# Patient Record
Sex: MALE | Race: WHITE | NOT HISPANIC OR LATINO | Employment: OTHER | URBAN - NONMETROPOLITAN AREA
[De-identification: names, ages, dates, MRNs, and addresses within clinical notes are randomized per-mention and may not be internally consistent; named-entity substitution may affect disease eponyms.]

---

## 2018-11-28 ENCOUNTER — HISTORICAL (OUTPATIENT)
Dept: ADMINISTRATIVE | Facility: HOSPITAL | Age: 63
End: 2018-11-28

## 2018-12-23 ENCOUNTER — HISTORICAL (OUTPATIENT)
Dept: ADMINISTRATIVE | Facility: HOSPITAL | Age: 63
End: 2018-12-23

## 2019-01-16 ENCOUNTER — HISTORICAL (OUTPATIENT)
Dept: CARDIOLOGY | Facility: HOSPITAL | Age: 64
End: 2019-01-16

## 2022-04-28 NOTE — OP NOTE
Patient:   Wallace Lowry            MRN: [id number]            FIN: [id number]               Age:   63 years     Sex:  Male     :  1955   Associated Diagnoses:   None   Author:   Mathieu Hanna MD      Indication-coronary artery disease  Procedure-coronary angiography, Ivus, stenting of mid RCA    Findings-mid RCA 74% lesion, proximal circumflex about 55-60% lesion  Status post successful stenting of the mid RCA with a 2.75x 15 mm drug-eluting stent    Hemostasis-vascade    Recommendations  Dual antiplatelet therapy  Follow-up in [facility]

## 2023-06-14 ENCOUNTER — HOSPITAL ENCOUNTER (EMERGENCY)
Facility: HOSPITAL | Age: 68
Discharge: ANOTHER HOSPITAL | End: 2023-06-14
Attending: FAMILY MEDICINE
Payer: MEDICARE

## 2023-06-14 ENCOUNTER — HOSPITAL ENCOUNTER (INPATIENT)
Facility: HOSPITAL | Age: 68
LOS: 8 days | Discharge: NURSING, REHAB OR LONG-TERM CARE | DRG: 065 | End: 2023-06-22
Attending: INTERNAL MEDICINE | Admitting: INTERNAL MEDICINE
Payer: MEDICARE

## 2023-06-14 VITALS
RESPIRATION RATE: 16 BRPM | HEIGHT: 64 IN | TEMPERATURE: 99 F | BODY MASS INDEX: 22.67 KG/M2 | SYSTOLIC BLOOD PRESSURE: 146 MMHG | OXYGEN SATURATION: 99 % | DIASTOLIC BLOOD PRESSURE: 117 MMHG | HEART RATE: 108 BPM | WEIGHT: 132.81 LBS

## 2023-06-14 DIAGNOSIS — R52 PAIN: ICD-10-CM

## 2023-06-14 DIAGNOSIS — I63.9 CEREBROVASCULAR ACCIDENT (CVA), UNSPECIFIED MECHANISM: Primary | ICD-10-CM

## 2023-06-14 DIAGNOSIS — I63.9 STROKE: ICD-10-CM

## 2023-06-14 DIAGNOSIS — I16.1 HYPERTENSIVE EMERGENCY: ICD-10-CM

## 2023-06-14 DIAGNOSIS — I10 HYPERTENSION: ICD-10-CM

## 2023-06-14 DIAGNOSIS — I63.9 CVA (CEREBRAL VASCULAR ACCIDENT): ICD-10-CM

## 2023-06-14 DIAGNOSIS — R55 SYNCOPE: ICD-10-CM

## 2023-06-14 LAB
ALBUMIN SERPL-MCNC: 4.9 G/DL (ref 3.4–5)
ALBUMIN/GLOB SERPL: 1.4 RATIO
ALP SERPL-CCNC: 77 UNIT/L (ref 50–144)
ALT SERPL-CCNC: 24 UNIT/L (ref 1–45)
AMPHET UR QL SCN: NEGATIVE
ANION GAP SERPL CALC-SCNC: 11 MEQ/L (ref 2–13)
APPEARANCE UR: CLEAR
APTT PPP: 23.7 SECONDS (ref 23–29.4)
AST SERPL-CCNC: 37 UNIT/L (ref 17–59)
BARBITURATE SCN PRESENT UR: NEGATIVE
BASOPHILS # BLD AUTO: 0.04 X10(3)/MCL (ref 0.01–0.08)
BASOPHILS NFR BLD AUTO: 0.5 % (ref 0.1–1.2)
BENZODIAZ UR QL SCN: NEGATIVE
BILIRUB UR QL STRIP.AUTO: ABNORMAL MG/DL
BILIRUBIN DIRECT+TOT PNL SERPL-MCNC: 1.2 MG/DL (ref 0–1)
BUN SERPL-MCNC: 19 MG/DL (ref 7–20)
CALCIUM SERPL-MCNC: 9.2 MG/DL (ref 8.4–10.2)
CANNABINOIDS UR QL SCN: NEGATIVE
CHLORIDE SERPL-SCNC: 104 MMOL/L (ref 98–110)
CHOLEST SERPL-MCNC: 219 MG/DL (ref 0–200)
CO2 SERPL-SCNC: 23 MMOL/L (ref 21–32)
COCAINE UR QL SCN: NEGATIVE
COLOR UR: YELLOW
CREAT SERPL-MCNC: 1.14 MG/DL (ref 0.66–1.25)
CREAT/UREA NIT SERPL: 17 (ref 12–20)
EOSINOPHIL # BLD AUTO: 0.02 X10(3)/MCL (ref 0.04–0.54)
EOSINOPHIL NFR BLD AUTO: 0.3 % (ref 0.7–7)
ERYTHROCYTE [DISTWIDTH] IN BLOOD BY AUTOMATED COUNT: 13.3 %
GFR SERPLBLD CREATININE-BSD FMLA CKD-EPI: 71 MLS/MIN/1.73/M2
GLOBULIN SER-MCNC: 3.4 GM/DL (ref 2–3.9)
GLUCOSE SERPL-MCNC: 91 MG/DL (ref 70–115)
GLUCOSE UR QL STRIP.AUTO: NEGATIVE MG/DL
HCT VFR BLD AUTO: 49.9 % (ref 36–52)
HDLC SERPL-MCNC: 86 MG/DL (ref 40–60)
HGB BLD-MCNC: 17.8 G/DL (ref 13–18)
HIV 1+2 AB+HIV1 P24 AG SERPL QL IA: NONREACTIVE
IMM GRANULOCYTES # BLD AUTO: 0.02 X10(3)/MCL (ref 0–0.03)
IMM GRANULOCYTES NFR BLD AUTO: 0.3 % (ref 0–0.5)
INR BLD: 0.97
KETONES UR QL STRIP.AUTO: 15 MG/DL
LDLC SERPL DIRECT ASSAY-SCNC: 100.6 MG/DL (ref 30–100)
LEUKOCYTE ESTERASE UR QL STRIP.AUTO: NEGATIVE UNIT/L
LYMPHOCYTES # BLD AUTO: 0.72 X10(3)/MCL (ref 1.32–3.57)
LYMPHOCYTES NFR BLD AUTO: 9.5 % (ref 20–55)
MCH RBC QN AUTO: 31.7 PG (ref 27–34)
MCHC RBC AUTO-ENTMCNC: 35.7 G/DL (ref 31–37)
MCV RBC AUTO: 88.8 FL (ref 79–99)
METHADONE UR QL SCN: NEGATIVE
MONOCYTES # BLD AUTO: 0.44 X10(3)/MCL (ref 0.3–0.82)
MONOCYTES NFR BLD AUTO: 5.8 % (ref 4.7–12.5)
NEUTROPHILS # BLD AUTO: 6.37 X10(3)/MCL (ref 1.78–5.38)
NEUTROPHILS NFR BLD AUTO: 83.6 % (ref 37–73)
NITRITE UR QL STRIP.AUTO: NEGATIVE
NRBC BLD AUTO-RTO: 0 %
OPIATES UR QL SCN: NEGATIVE
PCP UR QL: NEGATIVE
PH UR STRIP.AUTO: 6 [PH]
PH UR: 6 [PH] (ref 3–11)
PLATELET # BLD AUTO: 257 X10(3)/MCL (ref 140–371)
PMV BLD AUTO: 10.3 FL (ref 9.4–12.4)
POCT GLUCOSE: 73 MG/DL (ref 70–110)
POTASSIUM SERPL-SCNC: 4.3 MMOL/L (ref 3.5–5.1)
PROT SERPL-MCNC: 8.3 GM/DL (ref 6.3–8.2)
PROT UR QL STRIP.AUTO: NEGATIVE MG/DL
PROTHROMBIN TIME: 9.9 SECONDS (ref 9.3–11.9)
RBC # BLD AUTO: 5.62 X10(6)/MCL (ref 4–6)
RBC UR QL AUTO: ABNORMAL UNIT/L
SODIUM SERPL-SCNC: 138 MMOL/L (ref 135–145)
SP GR UR STRIP.AUTO: >=1.03
TRIGL SERPL-MCNC: 52 MG/DL (ref 30–200)
TROPONIN I SERPL-MCNC: <0.012 NG/ML (ref 0–0.03)
TSH SERPL-ACNC: 4.38 UIU/ML (ref 0.36–3.74)
UROBILINOGEN UR STRIP-ACNC: 1 MG/DL
VIT B12 SERPL-MCNC: 334 PG/ML (ref 213–816)
WBC # SPEC AUTO: 7.61 X10(3)/MCL (ref 4–11.5)

## 2023-06-14 PROCEDURE — 80053 COMPREHEN METABOLIC PANEL: CPT | Performed by: FAMILY MEDICINE

## 2023-06-14 PROCEDURE — 85610 PROTHROMBIN TIME: CPT | Performed by: FAMILY MEDICINE

## 2023-06-14 PROCEDURE — 85025 COMPLETE CBC W/AUTO DIFF WBC: CPT | Performed by: FAMILY MEDICINE

## 2023-06-14 PROCEDURE — 93010 EKG 12-LEAD: ICD-10-PCS | Mod: ,,, | Performed by: INTERNAL MEDICINE

## 2023-06-14 PROCEDURE — 81003 URINALYSIS AUTO W/O SCOPE: CPT | Performed by: FAMILY MEDICINE

## 2023-06-14 PROCEDURE — 85730 THROMBOPLASTIN TIME PARTIAL: CPT | Performed by: FAMILY MEDICINE

## 2023-06-14 PROCEDURE — 84484 ASSAY OF TROPONIN QUANT: CPT | Performed by: FAMILY MEDICINE

## 2023-06-14 PROCEDURE — 87389 HIV-1 AG W/HIV-1&-2 AB AG IA: CPT | Performed by: STUDENT IN AN ORGANIZED HEALTH CARE EDUCATION/TRAINING PROGRAM

## 2023-06-14 PROCEDURE — 84443 ASSAY THYROID STIM HORMONE: CPT | Performed by: FAMILY MEDICINE

## 2023-06-14 PROCEDURE — 93010 ELECTROCARDIOGRAM REPORT: CPT | Mod: ,,, | Performed by: INTERNAL MEDICINE

## 2023-06-14 PROCEDURE — 96366 THER/PROPH/DIAG IV INF ADDON: CPT

## 2023-06-14 PROCEDURE — 25000003 PHARM REV CODE 250: Performed by: FAMILY MEDICINE

## 2023-06-14 PROCEDURE — 93005 ELECTROCARDIOGRAM TRACING: CPT

## 2023-06-14 PROCEDURE — 20000000 HC ICU ROOM

## 2023-06-14 PROCEDURE — 63600175 PHARM REV CODE 636 W HCPCS: Performed by: STUDENT IN AN ORGANIZED HEALTH CARE EDUCATION/TRAINING PROGRAM

## 2023-06-14 PROCEDURE — 80061 LIPID PANEL: CPT | Performed by: FAMILY MEDICINE

## 2023-06-14 PROCEDURE — 25000003 PHARM REV CODE 250: Performed by: STUDENT IN AN ORGANIZED HEALTH CARE EDUCATION/TRAINING PROGRAM

## 2023-06-14 PROCEDURE — 96365 THER/PROPH/DIAG IV INF INIT: CPT

## 2023-06-14 PROCEDURE — 25500020 PHARM REV CODE 255: Performed by: INTERNAL MEDICINE

## 2023-06-14 PROCEDURE — 25000003 PHARM REV CODE 250: Performed by: INTERNAL MEDICINE

## 2023-06-14 PROCEDURE — 82962 GLUCOSE BLOOD TEST: CPT

## 2023-06-14 PROCEDURE — 80307 DRUG TEST PRSMV CHEM ANLYZR: CPT | Performed by: FAMILY MEDICINE

## 2023-06-14 PROCEDURE — 99285 EMERGENCY DEPT VISIT HI MDM: CPT | Mod: 25

## 2023-06-14 PROCEDURE — 36415 COLL VENOUS BLD VENIPUNCTURE: CPT | Performed by: FAMILY MEDICINE

## 2023-06-14 PROCEDURE — 82607 VITAMIN B-12: CPT | Performed by: STUDENT IN AN ORGANIZED HEALTH CARE EDUCATION/TRAINING PROGRAM

## 2023-06-14 PROCEDURE — 86780 TREPONEMA PALLIDUM: CPT | Performed by: INTERNAL MEDICINE

## 2023-06-14 RX ORDER — LABETALOL HYDROCHLORIDE 5 MG/ML
10 INJECTION, SOLUTION INTRAVENOUS
Status: DISCONTINUED | OUTPATIENT
Start: 2023-06-14 | End: 2023-06-15

## 2023-06-14 RX ORDER — SODIUM CHLORIDE, SODIUM LACTATE, POTASSIUM CHLORIDE, CALCIUM CHLORIDE 600; 310; 30; 20 MG/100ML; MG/100ML; MG/100ML; MG/100ML
INJECTION, SOLUTION INTRAVENOUS CONTINUOUS
Status: DISCONTINUED | OUTPATIENT
Start: 2023-06-14 | End: 2023-06-15

## 2023-06-14 RX ORDER — MUPIROCIN 20 MG/G
OINTMENT TOPICAL 2 TIMES DAILY
Status: COMPLETED | OUTPATIENT
Start: 2023-06-14 | End: 2023-06-19

## 2023-06-14 RX ORDER — SODIUM CHLORIDE 0.9 % (FLUSH) 0.9 %
10 SYRINGE (ML) INJECTION
Status: DISCONTINUED | OUTPATIENT
Start: 2023-06-14 | End: 2023-06-14

## 2023-06-14 RX ORDER — SODIUM CHLORIDE 0.9 % (FLUSH) 0.9 %
10 SYRINGE (ML) INJECTION
Status: DISCONTINUED | OUTPATIENT
Start: 2023-06-14 | End: 2023-06-22 | Stop reason: HOSPADM

## 2023-06-14 RX ORDER — NICARDIPINE HYDROCHLORIDE 0.2 MG/ML
0-15 INJECTION INTRAVENOUS CONTINUOUS
Status: DISCONTINUED | OUTPATIENT
Start: 2023-06-14 | End: 2023-06-14 | Stop reason: DRUGHIGH

## 2023-06-14 RX ORDER — ATORVASTATIN CALCIUM 40 MG/1
40 TABLET, FILM COATED ORAL DAILY
Status: DISCONTINUED | OUTPATIENT
Start: 2023-06-15 | End: 2023-06-22 | Stop reason: HOSPADM

## 2023-06-14 RX ORDER — NICARDIPINE HYDROCHLORIDE 0.2 MG/ML
2.5 INJECTION INTRAVENOUS CONTINUOUS
Status: DISCONTINUED | OUTPATIENT
Start: 2023-06-14 | End: 2023-06-14

## 2023-06-14 RX ORDER — NAPROXEN SODIUM 220 MG/1
81 TABLET, FILM COATED ORAL DAILY
Status: DISCONTINUED | OUTPATIENT
Start: 2023-06-15 | End: 2023-06-22 | Stop reason: HOSPADM

## 2023-06-14 RX ORDER — ASPIRIN 325 MG
325 TABLET ORAL ONCE
Status: COMPLETED | OUTPATIENT
Start: 2023-06-14 | End: 2023-06-14

## 2023-06-14 RX ADMIN — THIAMINE HYDROCHLORIDE 500 MG: 100 INJECTION, SOLUTION INTRAMUSCULAR; INTRAVENOUS at 06:06

## 2023-06-14 RX ADMIN — SODIUM CHLORIDE, POTASSIUM CHLORIDE, SODIUM LACTATE AND CALCIUM CHLORIDE 500 ML: 600; 310; 30; 20 INJECTION, SOLUTION INTRAVENOUS at 05:06

## 2023-06-14 RX ADMIN — MUPIROCIN: 20 OINTMENT TOPICAL at 09:06

## 2023-06-14 RX ADMIN — SODIUM CHLORIDE, POTASSIUM CHLORIDE, SODIUM LACTATE AND CALCIUM CHLORIDE: 600; 310; 30; 20 INJECTION, SOLUTION INTRAVENOUS at 07:06

## 2023-06-14 RX ADMIN — ASPIRIN 325 MG: 325 TABLET ORAL at 05:06

## 2023-06-14 RX ADMIN — NICARDIPINE HYDROCHLORIDE 5 MG/HR: 25 INJECTION, SOLUTION INTRAVENOUS at 12:06

## 2023-06-14 RX ADMIN — IOPAMIDOL 100 ML: 755 INJECTION, SOLUTION INTRAVENOUS at 05:06

## 2023-06-14 NOTE — NURSING
Nurses Note -- 4 Eyes      6/14/2023   5:10 PM      Skin assessed during: Admit      [x] No Altered Skin Integrity Present    [x]Prevention Measures Documented      [] Yes- Altered Skin Integrity Present or Discovered   [] LDA Added if Not in Epic (Describe Wound)   [] New Altered Skin Integrity was Present on Admit and Documented in LDA   [] Wound Image Taken    Wound Care Consulted? No    Attending Nurse:  Marlee Augustin RN     Second RN/Staff Member:  Lazaro Beckham RN

## 2023-06-14 NOTE — Clinical Note
Pt arrived to [facility] for [facility], AAO x 4, expressive aphasia noted-nods yes and no appropriately to questions, able to follow all commands. Awaiting anesthesia for consent.

## 2023-06-14 NOTE — ED PROVIDER NOTES
Encounter Date: 6/14/2023       History     Chief Complaint   Patient presents with    Cerebrovascular Accident    Extremity Weakness     Pt in per EMS from home with c/o right sided weakness and aphasia onset 24+ hours.       67-year-old white male brought to emergency room per [employer] past son calling EMS for patient with stroke symptoms of right-sided weakness and unable to talk.  Patient states all this started yesterday morning and has not changed.  Patient denies any pain nausea vomiting or fever.  Patient denies any voiding complaints.  Patient does have a past history of CVA as well as hypertension.  Patient states his last stroke or 2-3 years ago and he is not taken his blood pressure medicine years.    The history is provided by the patient and the EMS personnel.   Review of patient's allergies indicates:   Allergen Reactions    Shellfish derived      Past Medical History:   Diagnosis Date    Hypertension     Stroke      No past surgical history on file.  No family history on file.     Review of Systems   Neurological:  Positive for speech difficulty and weakness.   All other systems reviewed and are negative.    Physical Exam     Initial Vitals [06/14/23 1129]   BP Pulse Resp Temp SpO2   (!) 225/153 (!) 116 16 98.6 °F (37 °C) 99 %      MAP       --         Physical Exam    Nursing note and vitals reviewed.  Constitutional:   Thin unkempt white male alert in no acute distress.   HENT:   Head is atraumatic and normocephalic.  External canal with cerumen bilaterally.  Oropharynx is clear moist mucous membranes and extremely poor dentition.  Nose is clear with no discharge.  Mild right facial droop but normal sensation.   Eyes:   Pupils equal round reactive to light.  Extraocular muscles are intact.   Neck:   Neck is supple with full range of motion of cervical lymphadenopathy.   Cardiovascular:            Heart is tachycardic regular rate and rhythm without murmur.   Pulmonary/Chest:   Lungs with a rare  rhonchi but otherwise clear.   Abdominal:   Abdomen with positive bowel sounds throughout.  Abdomen is soft and nontender with no guarding or rebound.   Musculoskeletal:      Comments: Right upper and lower extremity with decreased range of motion and decreased strength.  Left upper extremity left lower extremity with normal sensation and strength.  No clubbing cyanosis or edema noted.     Neurological:   Patient has a awake and alert and oriented x3.  Patient with expressive aphasia.  As noted above patient with right-sided weakness but sensation appears to be intact.   Skin:   Skin is warm and dry.   Psychiatric:   Patient's mood and affect appear normal his thought content behavior also normal.       ED Course   Procedures  Labs Reviewed   COMPREHENSIVE METABOLIC PANEL - Abnormal; Notable for the following components:       Result Value    Protein Total 8.3 (*)     Bilirubin Total 1.2 (*)     All other components within normal limits   TSH - Abnormal; Notable for the following components:    Thyroid Stimulating Hormone 4.380 (*)     All other components within normal limits   LIPID PANEL - Abnormal; Notable for the following components:    Cholesterol Total 219 (*)     HDL Cholesterol 86 (*)     LDL Cholesterol Direct 100.6 (*)     All other components within normal limits   CBC WITH DIFFERENTIAL - Abnormal; Notable for the following components:    Neut % 83.6 (*)     Lymph % 9.5 (*)     Eos % 0.3 (*)     Lymph # 0.72 (*)     Neut # 6.37 (*)     Eos # 0.02 (*)     All other components within normal limits   URINALYSIS - Abnormal; Notable for the following components:    Ketones, UA 15 (*)     Blood, UA Trace-Intact (*)     Bilirubin, UA Small (*)     All other components within normal limits    Narrative:      URINE STABILITY IS 2 HOURS AT ROOM TEMP OR    SIX HOURS REFRIGERATED. PERFORMING TESTING ON    SPECIMENS GREATER THAN THIS AGE MAY AFFECT THE    FOLLOWING TESTS:    PH          SPECIFIC GRAVITY            BLOOD    CLARITY     BILIRUBIN               UROBILINOGEN   PROTIME-INR - Normal    Narrative:     Protimes are used to monitor anticoagulant agents such as warfarin. PT INR values are based on the current patient normal mean and the TONY value for the specific instrument reagent used.  **Routine theraputic target values for the INR are 2.0-3.0**   TROPONIN I - Normal   APTT - Normal   DRUG SCREEN, URINE (BEAKER) - Normal    Narrative:     Cut off concentrations:    Amphetamines - 1000 ng/ml  Barbiturates - 200 ng/ml  Benzodiazepine - 200 ng/ml  Cannabinoids (THC) - 50 ng/ml  Cocaine - 300 ng/ml  Fentanyl - 1.0 ng/ml  MDMA - 500 ng/ml  Opiates - 300 ng/ml   Phencyclidine (PCP) - 25 ng/ml  Methadone - 300 ng/ml      False negatives may result form substances such as bleach added to urine.  False positives may result for the presence of a substance with similar chemical structure to the drug or its metabolite.    This test provides only a PRELIMINARY analytical test result. A more specific alternate chemical method must be used in order to obtain a confirmed analytical result. Gas chromatography/mass spectrometry (GC/MS) is the preferred confirmatory method. Other chemical confirmation methods are available. Clinical consideration and professional judgement should be applied to any drug of abuse test result, particularly when preliminary positive results are used.    Positive results will be confirmed only at the physicians request. Unconfirmed screening results are to be used only for medical purposes (treatment).          CBC W/ AUTO DIFFERENTIAL    Narrative:     The following orders were created for panel order CBC W/ AUTO DIFFERENTIAL.  Procedure                               Abnormality         Status                     ---------                               -----------         ------                     CBC with Differential[[id number]]        Abnormal            Final result                 Please view results  for these tests on the individual orders.   SARS CORONAVIRUS 2 ANTIGEN POCT, MANUAL READ   POCT GLUCOSE     EKG Readings: (Independently Interpreted)   Initial Reading: No STEMI. Rhythm: Sinus Tachycardia. Heart Rate: 108. Ectopy: No Ectopy. Conduction: Normal. ST Segments: Normal ST Segments. T Waves: Normal. Axis: Normal. Clinical Impression: Sinus Tachycardia Other Impression: With LVH     Imaging Results              X-Ray Chest AP Portable (Final result)  Result time 06/14/23 12:18:49      Final result by Greg Alfredo III, MD (06/14/23 12:18:49)                   Impression:      1. I see no lobar or segmental infiltrates or other significant abnormalities.See above comments.      Electronically signed by: Greg Alfredo  Date:    06/14/2023  Time:    12:18               Narrative:    EXAMINATION:  STUDY: XR CHEST AP PORTABLE    CLINICAL HISTORY AND TECHNIQUE:  Juan Jose Chen, RT on 6/14/2023 11:50 AM    CURRENT CLINICAL HISTORY: ER PT    X 1 DAY    -AMS, RIGHT SIDED WEAKNESS    PAST MEDICAL HISTORY: HTN, STROKE    TECHNIQUE: 1 VIEW CHEST PORTABLE    TECHNOLOGIST: HANNAH    COMPARISON:  None    FINDINGS:  The lungs are under expanded exaggerating the interstitial lung markings.The cardiac, hilar, and mediastinal contours appear unremarkable.I see no lobar or segmental infiltrates.No significant pleural effusions are noted.Mild degenerative changes are noted involving the thoracic spine.                                       CT Head Without Contrast (Final result)  Result time 06/14/23 12:18:20      Final result by Greg Alfredo III, MD (06/14/23 12:18:20)                   Impression:      1. Chronic changes are present including atrophy and ischemia.  See above comments.      Electronically signed by: Greg Alfredo  Date:    06/14/2023  Time:    12:18               Narrative:    EXAMINATION:  STUDY: CT HEAD WITHOUT CONTRAST    CLINICAL HISTORY AND TECHNIQUE:  Josephine Burr, RT on 6/14/2023 11:59 AM    PT STATUS:  ER    PROCEDURE: CT BRAIN WO    CLINICAL HX : X'S 2 DAYS, AMS, RT SIDE WEAKNESS, APHASIA    PMH: HTN, CVA    IV CONTRAST: NONE    ORAL CONTRAST: NONE    RECTAL CONTRAST: NONE    AXIAL IMAGES @ 5MM INTERVALS WITH MULTIPLANAR RECONSTRUCTION    TOTAL IMAGE NUMBER: 35    NUMBER OF CT SCANS IN PAST 12 MONTHS: 1    CTDIvol(mGy): HEAD:  52.3   BODY:    DLP(mGycm): HEAD: 1018.2    BODY:    TECH: AKG/DB    PT TRANSPORTED W/O INCIDENT    This patient has had 1 CT and nuclear medicine scans performed within the last 12 months.    The following DOSE REDUCTION TECHNIQUES are used for all CT scans at [facility]:    1. Automated exposure control.  2. Adjustment of the mA and/or kv according to patient size.  3. Use of iterative reconstruction technique.    COMPARISON:  12/23/2018    FINDINGS:  Axial imaging through the head/brain was performed. Moderate cerebral atrophy accentuates the ventricles and sulci and fairly extensive chronic ischemic changes are noted within the deep white matter of both cerebral hemispheres.  I see no intraparenchymal masses, hemorrhagic lesions, or dominant wedge-shaped infarcts. I see no extra-axial masses or abnormal fluid collections.                                       Medications   niCARdipine (CARDENE) 0.2 mg/mL in sodium chloride 0.9% 250 mL infusion (0 mg/hr Intravenous Stopped 6/14/23 1415)     Medical Decision Making:   Initial Assessment:   CVA  Differential Diagnosis:   TIA, hypertension  Clinical Tests:   Lab Tests: Ordered and Reviewed  The following lab test(s) were unremarkable: CBC  Radiological Study: Ordered and Reviewed  Medical Tests: Ordered and Reviewed  ED Management:  We will go ahead and do stroke protocol and started on Cardene drip for his hypertension.  We will go ahead and do a stat CT of the head without.  Patient's CT of head and chest x-ray are both unremarkable.  Awaiting all blood results.  Patient's blood pressure is coming down on the Cardene  drip.  Patient's blood work is unremarkable.  Because of patient's CVA will go ahead and attempted transfer for neurology care.  Patient's blood pressure is doing better again on the Cardene drip.  Other:   I have discussed this case with another health care provider.       <> Summary of the Discussion: Dr. Mathews will be accepting physician at [facility] patient will be a direct admit to ICU bed 21.                        Clinical Impression:   Final diagnoses:  [I63.9] Stroke  [R52] Pain  [I63.9] Cerebrovascular accident (CVA), unspecified mechanism (Primary)  [I16.1] Hypertensive emergency        ED Disposition Condition    Transfer to Another Facility Stable                Quentin Perkins MD  06/14/23 2154

## 2023-06-14 NOTE — ED NOTES
Per Annalise, ED RN, reported that pt was accepted at [facility] ICU Bed 21.  Report called to [phone].  Accepted by Dr. Mathews.

## 2023-06-14 NOTE — H&P
[facility] - [facility] ICU  Pulmonary Critical Care Note    Patient Name: Wallace Lowry  MRN: [id number]  Admission Date: 6/14/2023  Hospital Length of Stay: 0 days  Code Status: Full Code  Attending Provider: Yobani Pinon MD  Primary Care Provider: Primary Doctor No     Subjective:     HPI:     Wallace Lowry is a 67 y.o. male with PMH of HTN, HLD, prior CVA, carotid artery stenosis s/p R carotid stent approximately 2 years ago, alcohol abuse, who presents to [facility] as a transfer from [facility] for neurology services for CVA workup. Patient had originally woken up 6/13 with new findings of right arm and right leg weakness, right sided facial droop, and slurred speech.  Last known normal was the night prior of 6/12. These symptoms persisted throughout the day and diet and he was brought to ED for evaluation today 6/14. Patient does have hx of stroke and HTN but states he has not taken any of his his medications for at least a year as he could not afford them.  Patient denies having any headache, dizziness, seizures, falls, syncopal episodes.  Denies any chest pain, shortness of breath, palpitations.  He has no reported history of arrhythmias. Patient is a former smoker, quit smoking about 9 months ago, about 8 year history smoking 1 ppd. He denies illicit drug use, though endorses drinking at least a 6 pack per beer daily with last drink 2 days ago. At OSH patient was found to be significantly hypertensive with a BP of 225/153.  Also tachycardic.  Afebrile.  Lab work including CBC, CMP, cardiac enzymes, UA, UDS were unremarkable.  He had a CT head that was negative for acute hemorrhage, though did reveal chronic atrophy and chronic ischemic changes.  They had put the patient on a nicardipine infusion, and he was transferred directly to [facility] ICU for further workup and evaluation.    Hospital Course/Significant events:      24 Hour Interval History:      Past Medical History:   Diagnosis Date     Hypertension     Stroke        No past surgical history on file.    Social History     Socioeconomic History    Marital status:            No current outpatient medications    Current Inpatient Medications   [START ON 6/15/2023] aspirin  81 mg Oral Daily    [START ON 6/15/2023] atorvastatin  40 mg Oral Daily    [START ON 6/15/2023] folic acid (FOLVITE) IVPB  1 mg Intravenous Daily    lactated ringers  500 mL Intravenous Once    [START ON 6/15/2023] multivitamin  1 tablet Oral Daily    mupirocin   Nasal BID    thiamine (VITAMIN B1) IVPB  500 mg Intravenous TID    Followed by    [START ON 6/18/2023] thiamine (VITAMIN B1) IVPB  250 mg Intravenous Daily       Current Intravenous Infusions   lactated ringers           Review of Systems   Constitutional:  Negative for chills and fever.   HENT:  Positive for hearing loss (right, chronic).    Eyes:  Negative for blurred vision.   Respiratory:  Negative for shortness of breath.    Cardiovascular:  Negative for chest pain.   Gastrointestinal:  Negative for abdominal pain, nausea and vomiting.   Musculoskeletal:  Negative for myalgias.   Skin:  Negative for rash.   Neurological:  Positive for speech change and focal weakness. Negative for dizziness, seizures, loss of consciousness and headaches.        Objective:     No intake or output data in the 24 hours ending 06/14/23 1757      Vital Signs (Most Recent):  Temp: 98.3 °F (36.8 °C) (06/14/23 1640)  Pulse: (!) 133 (06/14/23 1700)  Resp: (!) 22 (06/14/23 1700)  BP: (!) 152/101 (06/14/23 1700)  SpO2: 97 % (06/14/23 1700)  Body mass index is 20.25 kg/m².  Weight: 53.5 kg (117 lb 15.1 oz) Vital Signs (24h Range):  Temp:  [98.3 °F (36.8 °C)-98.6 °F (37 °C)] 98.3 °F (36.8 °C)  Pulse:  [104-141] 133  Resp:  [16-22] 22  SpO2:  [96 %-100 %] 97 %  BP: (132-225)/() 152/101     Physical Exam  Constitutional:       General: He is not in acute distress.     Appearance: He is not ill-appearing.   HENT:      Head: Normocephalic  and atraumatic.      Right Ear: External ear normal.      Left Ear: External ear normal.      Mouth/Throat:      Mouth: Mucous membranes are dry.   Eyes:      General: No scleral icterus.     Extraocular Movements: Extraocular movements intact.      Pupils: Pupils are equal, round, and reactive to light.   Cardiovascular:      Rate and Rhythm: Regular rhythm. Tachycardia present.      Pulses: Normal pulses.      Heart sounds: No murmur heard.    No friction rub. No gallop.   Pulmonary:      Effort: No respiratory distress.      Breath sounds: No wheezing, rhonchi or rales.   Abdominal:      General: There is no distension.      Palpations: Abdomen is soft.      Tenderness: There is no abdominal tenderness.   Musculoskeletal:      Cervical back: Normal range of motion.      Right lower leg: No edema.      Left lower leg: No edema.   Skin:     General: Skin is warm and dry.   Neurological:      Mental Status: He is alert.      Comments: Awake, alert and oriented x3. CN II-XII grossly intact with exception of right sided facial droop and chronic right ear hearing loss. Unable to hold RUE and RLE up against gravity. LUE and LLE 5/5 strength. Sensation to light touch intact in all 4 extremities. Intact finger to nose on the left, right unable to perform due to weakness. Speech is dysarthric. Gait deferred          Lines/Drains/Airways       Peripheral Intravenous Line  Duration                  Peripheral IV - Single Lumen 18 G Anterior;Distal;Left Antecubital -- days                    Significant Labs:    Lab Results   Component Value Date    WBC 7.61 06/14/2023    HGB 17.8 06/14/2023    HCT 49.9 06/14/2023    MCV 88.8 06/14/2023     06/14/2023           BMP  Lab Results   Component Value Date     06/14/2023    K 4.3 06/14/2023    CHLORIDE 104 06/14/2023    CO2 23 06/14/2023    BUN 19.0 06/14/2023    CREATININE 1.14 06/14/2023    CALCIUM 9.2 06/14/2023    AGAP 11.0 06/14/2023         ABG  No results for  input(s): PH, PO2, PCO2, HCO3, POCBASEDEF in the last 168 hours.    Mechanical Ventilation Support:         Significant Imaging:  I have reviewed the pertinent imaging within the past 24 hours.        Assessment/Plan:     Assessment  CVA rule-out in the setting of right sided weakness, facial droop, dysarthria  Alcohol abuse  Hypertension  Hyperlipidemia  Hx CVA  Hx carotid artery stenosis s/p right carotid stent about 2 years ago    Plan  Admit to ICU for further management  Will discontinue nicardipine infusion, allow for permissive hypertension up to 220/120  CTH was negative for hemorrhage. Obtain CTA head and neck, brain MRI, TTE w/ bubble  Load with aspirin, start high intensity statin  Continuous telemetry monitoring. Patient in sinus rhythm  Neurology consulted, appreciate recommendation  Maintenance IVF  Keep NPO until passes Elias swallow eval/cleared by SLP  Thiamine/MVI and CIWA protocol with PRN ativan for withdrawal symptoms    DVT Prophylaxis: SCDs     32 minutes of critical care was time spent personally by me on the following activities: development of treatment plan with patient or surrogate and bedside caregivers, discussions with consultants, evaluation of patient's response to treatment, examination of patient, ordering and performing treatments and interventions, ordering and review of laboratory studies, ordering and review of radiographic studies, pulse oximetry, re-evaluation of patient's condition.  This critical care time did not overlap with that of any other provider or involve time for any procedures.     Estrada Ulloa MD  Pulmonary Critical Care Medicine  [facility] - [facility] ICU

## 2023-06-15 PROBLEM — I63.9 STROKE: Status: ACTIVE | Noted: 2023-06-15

## 2023-06-15 LAB
ANION GAP SERPL CALC-SCNC: 11 MEQ/L
BASOPHILS # BLD AUTO: 0.06 X10(3)/MCL
BASOPHILS NFR BLD AUTO: 0.8 %
BUN SERPL-MCNC: 12.8 MG/DL (ref 8.4–25.7)
CALCIUM SERPL-MCNC: 8.6 MG/DL (ref 8.8–10)
CHLORIDE SERPL-SCNC: 101 MMOL/L (ref 98–107)
CO2 SERPL-SCNC: 23 MMOL/L (ref 23–31)
CREAT SERPL-MCNC: 0.93 MG/DL (ref 0.73–1.18)
CREAT/UREA NIT SERPL: 14
EOSINOPHIL # BLD AUTO: 0.05 X10(3)/MCL (ref 0–0.9)
EOSINOPHIL NFR BLD AUTO: 0.7 %
ERYTHROCYTE [DISTWIDTH] IN BLOOD BY AUTOMATED COUNT: 13.5 % (ref 11.5–17)
EST. AVERAGE GLUCOSE BLD GHB EST-MCNC: 88.2 MG/DL
GFR SERPLBLD CREATININE-BSD FMLA CKD-EPI: >60 MLS/MIN/1.73/M2
GLUCOSE SERPL-MCNC: 88 MG/DL (ref 82–115)
HBA1C MFR BLD: 4.7 %
HCT VFR BLD AUTO: 44.9 % (ref 42–52)
HGB BLD-MCNC: 15.7 G/DL (ref 14–18)
IMM GRANULOCYTES # BLD AUTO: 0.03 X10(3)/MCL (ref 0–0.04)
IMM GRANULOCYTES NFR BLD AUTO: 0.4 %
LYMPHOCYTES # BLD AUTO: 1.51 X10(3)/MCL (ref 0.6–4.6)
LYMPHOCYTES NFR BLD AUTO: 19.8 %
MCH RBC QN AUTO: 31.2 PG (ref 27–31)
MCHC RBC AUTO-ENTMCNC: 35 G/DL (ref 33–36)
MCV RBC AUTO: 89.1 FL (ref 80–94)
MONOCYTES # BLD AUTO: 0.59 X10(3)/MCL (ref 0.1–1.3)
MONOCYTES NFR BLD AUTO: 7.7 %
NEUTROPHILS # BLD AUTO: 5.38 X10(3)/MCL (ref 2.1–9.2)
NEUTROPHILS NFR BLD AUTO: 70.6 %
NRBC BLD AUTO-RTO: 0 %
PLATELET # BLD AUTO: 261 X10(3)/MCL (ref 130–400)
PMV BLD AUTO: 10.9 FL (ref 7.4–10.4)
POTASSIUM SERPL-SCNC: 3.9 MMOL/L (ref 3.5–5.1)
RBC # BLD AUTO: 5.04 X10(6)/MCL (ref 4.7–6.1)
SODIUM SERPL-SCNC: 135 MMOL/L (ref 136–145)
WBC # SPEC AUTO: 7.62 X10(3)/MCL (ref 4.5–11.5)

## 2023-06-15 PROCEDURE — 80048 BASIC METABOLIC PNL TOTAL CA: CPT | Performed by: STUDENT IN AN ORGANIZED HEALTH CARE EDUCATION/TRAINING PROGRAM

## 2023-06-15 PROCEDURE — 63600175 PHARM REV CODE 636 W HCPCS: Performed by: INTERNAL MEDICINE

## 2023-06-15 PROCEDURE — 21400001 HC TELEMETRY ROOM

## 2023-06-15 PROCEDURE — 63600175 PHARM REV CODE 636 W HCPCS: Performed by: NURSE PRACTITIONER

## 2023-06-15 PROCEDURE — 92611 MOTION FLUOROSCOPY/SWALLOW: CPT

## 2023-06-15 PROCEDURE — 25000003 PHARM REV CODE 250: Performed by: STUDENT IN AN ORGANIZED HEALTH CARE EDUCATION/TRAINING PROGRAM

## 2023-06-15 PROCEDURE — 85025 COMPLETE CBC W/AUTO DIFF WBC: CPT | Performed by: STUDENT IN AN ORGANIZED HEALTH CARE EDUCATION/TRAINING PROGRAM

## 2023-06-15 PROCEDURE — 83036 HEMOGLOBIN GLYCOSYLATED A1C: CPT | Performed by: STUDENT IN AN ORGANIZED HEALTH CARE EDUCATION/TRAINING PROGRAM

## 2023-06-15 PROCEDURE — 63600175 PHARM REV CODE 636 W HCPCS: Performed by: STUDENT IN AN ORGANIZED HEALTH CARE EDUCATION/TRAINING PROGRAM

## 2023-06-15 PROCEDURE — 92610 EVALUATE SWALLOWING FUNCTION: CPT

## 2023-06-15 PROCEDURE — 25000003 PHARM REV CODE 250: Performed by: INTERNAL MEDICINE

## 2023-06-15 RX ORDER — HYDROCHLOROTHIAZIDE 25 MG/1
25 TABLET ORAL DAILY
Status: ON HOLD | COMMUNITY
End: 2023-06-19 | Stop reason: HOSPADM

## 2023-06-15 RX ORDER — LABETALOL HYDROCHLORIDE 5 MG/ML
10 INJECTION, SOLUTION INTRAVENOUS EVERY 4 HOURS PRN
Status: DISCONTINUED | OUTPATIENT
Start: 2023-06-15 | End: 2023-06-22 | Stop reason: HOSPADM

## 2023-06-15 RX ORDER — CLONIDINE HYDROCHLORIDE 0.1 MG/1
0.1 TABLET ORAL EVERY 6 HOURS PRN
Status: DISCONTINUED | OUTPATIENT
Start: 2023-06-15 | End: 2023-06-22 | Stop reason: HOSPADM

## 2023-06-15 RX ORDER — HYDRALAZINE HYDROCHLORIDE 20 MG/ML
10 INJECTION INTRAMUSCULAR; INTRAVENOUS EVERY 4 HOURS PRN
Status: DISCONTINUED | OUTPATIENT
Start: 2023-06-15 | End: 2023-06-15

## 2023-06-15 RX ORDER — ENOXAPARIN SODIUM 100 MG/ML
40 INJECTION SUBCUTANEOUS EVERY 12 HOURS
Status: DISCONTINUED | OUTPATIENT
Start: 2023-06-15 | End: 2023-06-15

## 2023-06-15 RX ORDER — LABETALOL 200 MG/1
200 TABLET, FILM COATED ORAL EVERY 12 HOURS
Status: DISCONTINUED | OUTPATIENT
Start: 2023-06-15 | End: 2023-06-16

## 2023-06-15 RX ORDER — CLOPIDOGREL BISULFATE 75 MG/1
75 TABLET ORAL DAILY
Status: ON HOLD | COMMUNITY
End: 2023-06-19 | Stop reason: HOSPADM

## 2023-06-15 RX ORDER — AMLODIPINE BESYLATE 10 MG/1
10 TABLET ORAL NIGHTLY
COMMUNITY

## 2023-06-15 RX ORDER — HYDRALAZINE HYDROCHLORIDE 20 MG/ML
10 INJECTION INTRAMUSCULAR; INTRAVENOUS EVERY 4 HOURS PRN
Status: DISCONTINUED | OUTPATIENT
Start: 2023-06-15 | End: 2023-06-22 | Stop reason: HOSPADM

## 2023-06-15 RX ORDER — METOPROLOL SUCCINATE 100 MG/1
100 TABLET, EXTENDED RELEASE ORAL DAILY
Status: ON HOLD | COMMUNITY
End: 2023-06-19 | Stop reason: HOSPADM

## 2023-06-15 RX ORDER — SODIUM CHLORIDE, SODIUM LACTATE, POTASSIUM CHLORIDE, CALCIUM CHLORIDE 600; 310; 30; 20 MG/100ML; MG/100ML; MG/100ML; MG/100ML
INJECTION, SOLUTION INTRAVENOUS CONTINUOUS
Status: DISCONTINUED | OUTPATIENT
Start: 2023-06-15 | End: 2023-06-16

## 2023-06-15 RX ORDER — ENOXAPARIN SODIUM 100 MG/ML
40 INJECTION SUBCUTANEOUS EVERY 24 HOURS
Status: DISCONTINUED | OUTPATIENT
Start: 2023-06-15 | End: 2023-06-22 | Stop reason: HOSPADM

## 2023-06-15 RX ORDER — ATORVASTATIN CALCIUM 80 MG/1
80 TABLET, FILM COATED ORAL DAILY
COMMUNITY

## 2023-06-15 RX ORDER — AMLODIPINE BESYLATE 5 MG/1
10 TABLET ORAL DAILY
Status: DISCONTINUED | OUTPATIENT
Start: 2023-06-15 | End: 2023-06-22 | Stop reason: HOSPADM

## 2023-06-15 RX ORDER — LABETALOL HYDROCHLORIDE 5 MG/ML
10 INJECTION, SOLUTION INTRAVENOUS EVERY 4 HOURS PRN
Status: DISCONTINUED | OUTPATIENT
Start: 2023-06-15 | End: 2023-06-15

## 2023-06-15 RX ADMIN — THERA TABS 1 TABLET: TAB at 08:06

## 2023-06-15 RX ADMIN — THIAMINE HYDROCHLORIDE 500 MG: 100 INJECTION, SOLUTION INTRAMUSCULAR; INTRAVENOUS at 06:06

## 2023-06-15 RX ADMIN — THIAMINE HYDROCHLORIDE 500 MG: 100 INJECTION, SOLUTION INTRAMUSCULAR; INTRAVENOUS at 11:06

## 2023-06-15 RX ADMIN — LABETALOL HYDROCHLORIDE 10 MG: 5 INJECTION, SOLUTION INTRAVENOUS at 01:06

## 2023-06-15 RX ADMIN — MUPIROCIN: 20 OINTMENT TOPICAL at 09:06

## 2023-06-15 RX ADMIN — THIAMINE HYDROCHLORIDE 500 MG: 100 INJECTION, SOLUTION INTRAMUSCULAR; INTRAVENOUS at 08:06

## 2023-06-15 RX ADMIN — AMLODIPINE BESYLATE 10 MG: 5 TABLET ORAL at 03:06

## 2023-06-15 RX ADMIN — LABETALOL HYDROCHLORIDE 10 MG: 5 INJECTION, SOLUTION INTRAVENOUS at 08:06

## 2023-06-15 RX ADMIN — LABETALOL HYDROCHLORIDE 200 MG: 200 TABLET, FILM COATED ORAL at 06:06

## 2023-06-15 RX ADMIN — MUPIROCIN: 20 OINTMENT TOPICAL at 08:06

## 2023-06-15 RX ADMIN — THIAMINE HYDROCHLORIDE 500 MG: 100 INJECTION, SOLUTION INTRAMUSCULAR; INTRAVENOUS at 12:06

## 2023-06-15 RX ADMIN — ASPIRIN 81 MG CHEWABLE TABLET 81 MG: 81 TABLET CHEWABLE at 08:06

## 2023-06-15 RX ADMIN — SODIUM CHLORIDE, POTASSIUM CHLORIDE, SODIUM LACTATE AND CALCIUM CHLORIDE: 600; 310; 30; 20 INJECTION, SOLUTION INTRAVENOUS at 06:06

## 2023-06-15 RX ADMIN — FOLIC ACID 1 MG: 5 INJECTION, SOLUTION INTRAMUSCULAR; INTRAVENOUS; SUBCUTANEOUS at 08:06

## 2023-06-15 RX ADMIN — ENOXAPARIN SODIUM 40 MG: 40 INJECTION SUBCUTANEOUS at 04:06

## 2023-06-15 RX ADMIN — HYDRALAZINE HYDROCHLORIDE 10 MG: 20 INJECTION INTRAMUSCULAR; INTRAVENOUS at 04:06

## 2023-06-15 RX ADMIN — ATORVASTATIN CALCIUM 40 MG: 40 TABLET, FILM COATED ORAL at 08:06

## 2023-06-15 RX ADMIN — SODIUM CHLORIDE, POTASSIUM CHLORIDE, SODIUM LACTATE AND CALCIUM CHLORIDE: 600; 310; 30; 20 INJECTION, SOLUTION INTRAVENOUS at 10:06

## 2023-06-15 NOTE — NURSING
Nurses Note -- 4 Eyes      6/15/2023   6:14 PM      Skin assessed during: Daily Assessment      [x] No Altered Skin Integrity Present    [x]Prevention Measures Documented      [] Yes- Altered Skin Integrity Present or Discovered   [] LDA Added if Not in Epic (Describe Wound)   [] New Altered Skin Integrity was Present on Admit and Documented in LDA   [] Wound Image Taken    Wound Care Consulted? No    Attending Nurse:  Linda Tellez RN     Second RN/Staff Member:  Thanh

## 2023-06-15 NOTE — PLAN OF CARE
06/15/23 1215   Discharge Assessment   Assessment Type Discharge Planning Assessment   Confirmed/corrected address, phone number and insurance Yes   Confirmed Demographics Correct on Facesheet   Source of Information patient;family   When was your last doctors appointment?   (None.)   Communicated RICKIE with patient/caregiver Yes   Reason For Admission Hypertension   People in Home child(cinthia), adult   Do you expect to return to your current living situation? Yes   Do you have help at home or someone to help you manage your care at home? Yes   Who are your caregiver(s) and their phone number(s)? Son: Elie Lowry: [phone] and BrotherFam: [phone]   Prior to hospitilization cognitive status: Unable to Assess   Current cognitive status: Alert/Oriented   Home Accessibility stairs to enter home   Number of Stairs, Main Entrance six   Home Layout Able to live on 1st floor   Equipment Currently Used at Home none   Readmission within 30 days? No   Patient currently being followed by outpatient case management? No   Do you currently have service(s) that help you manage your care at home? No   Do you take prescription medications? Yes   Do you have prescription coverage? Yes   Coverage Medicare Part A & B   Do you have any problems affording any of your prescribed medications? No   Is the patient taking medications as prescribed? no   If no, which medications is patient not taking? Patient does not take any meds are prescribed   Who is going to help you get home at discharge? Patient's son, Elie Lowry   How do you get to doctors appointments? car, drives self   Are you on dialysis? No   Do you take coumadin?   (Unknown)   Discharge Plan A Home with family   Discharge Plan B Home   Discharge Plan discussed with: Patient;Sibling;Adult children   Name(s) and Number(s) Son: Elie Lowry: [phone] BrotherFam: [phone]   Transition of Care Barriers None   OTHER   Name(s) of People in  Home Patient resides with his son.       Patient's physical address: [address] [place] [id number].  SW discussed Rethinking Drinking education after patient reported drinking 6 10 oz cans of beers daily. Patient verbalized understanding, agreed and accepted information.

## 2023-06-15 NOTE — CONSULTS
[facility] - [facility] ICU  Neurology  Consult Note    Patient Name: Wallace Lowry  MRN: [id number]  Admission Date: 6/14/2023  Hospital Length of Stay: 1 days  Code Status: Full Code   Attending Provider: Lilia Woods MD   Consulting Provider: Nieves Marquez NP  Primary Care Physician: Primary Doctor No  Principal Problem:<principal problem not specified>    Inpatient consult to Neurology  Consult performed by: Nieves Marquez NP  Consult ordered by: Estrada Ulloa MD         Subjective:     Chief Complaint:  Right sided weakness     HPI:   67 year old male with a past medical history of HTN, HLD, prior CVA (unknown deficits), VIRI s/p R carotid stent, former smoker (quit smoking 9 months ago), and alcohol abuse presented to [facility] as a transfer from [facility] for stroke. Per EMR last known normal 6/12. He woke up on 6/13 with right sided weakness, facial droop, and slurred speech. He was brought to ED on 6/14 for further evaluation. He reported he was not compliant with medications. Upon arrival at [facility], /153. CT head was negative for acute intracranial abnormalities. He was transferred to [facility] for hypertensive crisis and further workup. Neurology was consulted for stroke workup.               Past Medical History:   Diagnosis Date    Hypertension     Stroke        No past surgical history on file.    Review of patient's allergies indicates:   Allergen Reactions    Shellfish derived        Current Neurological Medications:     Current Facility-Administered Medications on File Prior to Encounter   Medication    [DISCONTINUED] niCARdipine (CARDENE) 0.2 mg/mL in sodium chloride 0.9% 250 mL infusion    [DISCONTINUED] niCARdipine 40 mg/200 mL (0.2 mg/mL) infusion    [DISCONTINUED] niCARdipine 40 mg/200 mL (0.2 mg/mL) infusion     No current outpatient medications on file prior to encounter.     Family History    None       Tobacco Use    Smoking status: Not on  file    Smokeless tobacco: Not on file   Substance and Sexual Activity    Alcohol use: Not on file    Drug use: Not on file    Sexual activity: Not on file     Review of Systems   Unable to perform ROS: Acuity of condition (limited information 2/2 dysarthria/aphasia)   Objective:     Vital Signs (Most Recent):  Temp: 98.1 °F (36.7 °C) (06/15/23 0800)  Pulse: 104 (06/15/23 0900)  Resp: 17 (06/15/23 0900)  BP: (!) 168/105 (06/15/23 0900)  SpO2: 97 % (06/15/23 0900) Vital Signs (24h Range):  Temp:  [98.1 °F (36.7 °C)-98.6 °F (37 °C)] 98.1 °F (36.7 °C)  Pulse:  [] 104  Resp:  [13-25] 17  SpO2:  [96 %-100 %] 97 %  BP: (132-204)/() 168/105     Weight: 53.5 kg (117 lb 15.1 oz)  Body mass index is 20.25 kg/m².     Physical Exam  Vitals and nursing note reviewed.   Constitutional:       General: He is not in acute distress.     Appearance: He is not toxic-appearing.   HENT:      Head: Normocephalic.   Eyes:      General: No visual field deficit.     Pupils: Pupils are equal, round, and reactive to light.   Pulmonary:      Effort: Pulmonary effort is normal.   Musculoskeletal:         General: Normal range of motion.      Cervical back: Normal range of motion.      Right lower leg: No edema.      Left lower leg: No edema.   Skin:     General: Skin is warm and dry.      Capillary Refill: Capillary refill takes less than 2 seconds.   Neurological:      Mental Status: He is alert.      Cranial Nerves: Dysarthria and facial asymmetry (mild right facial droop) present.      Sensory: No sensory deficit.      Motor: Weakness (RUE, RLE weakness) present. No tremor, atrophy, abnormal muscle tone or seizure activity.      Comments: Oriented to self, delayed responses   Slowed dysarthric speech  Able to repeat phrases  Follow commands  Right sided weakness, RUE 3/5, RLE 3+/5     Psychiatric:         Attention and Perception: Attention normal.         Mood and Affect: Affect is labile and tearful.         Speech: Speech  is delayed and slurred.         Behavior: Behavior is slowed. Behavior is cooperative.        NEUROLOGICAL EXAMINATION:     MENTAL STATUS   Speech: slurred     CRANIAL NERVES     CN III, IV, VI   Pupils are equal, round, and reactive to light.    Significant Labs:   Recent Lab Results         06/15/23  0104   06/14/23  1656   06/14/23  1330   06/14/23  1207        Phencyclidine     Negative         Albumin/Globulin Ratio       1.4       Albumin       4.9       Alkaline Phosphatase       77       ALT       24       Amphetamine Screen, Ur     Negative         Anion Gap 11.0       11.0       Appearance, UA     Clear         aPTT       23.7  Comment: For Minimal Heparin Infusion, the goal aPTT 64-85 seconds corresponds to an anti-Xa of 0.3-0.5.    For Low Intensity and High Intensity Heparin, the goal aPTT  seconds corresponds to an anti-Xa of 0.3-0.7       AST       37       Barbiturate Screen, Ur     Negative         Baso # 0.06       0.04       Basophil % 0.8       0.5       Benzodiazepine Screen, Urine     Negative         BILIRUBIN TOTAL       1.2       Bilirubin, UA     Small         BUN 12.8       19.0       BUN/CREAT RATIO 14       17       Calcium 8.6       9.2       Cannabinoids, Urine     Negative         Chloride 101       104       Cholesterol       219       CO2 23       23       Cocaine (Metab.)     Negative         Color, UA     Yellow         Creatinine 0.93       1.14       eGFR >60       71  Comment:                      EGFR INTERPRETATION    Beginning 8/15/22 we are reporting the eGFRcr calculation as recommended by the National Kidney Foundation. The eGFRcr equation has similar overall performance characteristics to the older equation, but the values may differ by more than 10% particularly at higher values of eGFRcr and younger adult ages.    NKF stages of chronic kidney disease (CKD)  Stage 1: Kidney damage with normal or increased eGFR (>90 mL/min/1.73 m^2)  Stage 2: Mild reduction in GFR  (60-89 mL/min/1.73 m^2)  Stage 3a: Moderate reduction in GFR (45-59 mL/min/1.73 m^2)  Stage 3b: Moderate reduction in GFR (30-44 mL/min/1.73 m^2)  Stage 4: Severe reduction in GFR (15-29 mL/min/1.73 m^2)  Stage 5: Kidney failure (GFR <15 mL/min/1.73 m^2)           Eos # 0.05       0.02       Eosinophil % 0.7       0.3       Estimated Avg Glucose 88.2             Globulin, Total       3.4       Glucose 88       91       Glucose, UA     Negative         HDL       86       Hematocrit 44.9       49.9       Hemoglobin 15.7       17.8       Hemoglobin A1C External 4.7             HIV   Nonreactive           Immature Grans (Abs) 0.03       0.02       Immature Granulocytes 0.4       0.3       INR       0.97       Ketones, UA     15         LDL Cholesterol Direct       100.6       Leukocytes, UA     Negative         Lymph # 1.51       0.72       LYMPH % 19.8       9.5       MCH 31.2       31.7       MCHC 35.0       35.7       MCV 89.1       88.8       Methadone, Urine     Negative         Mono # 0.59       0.44       Mono % 7.7       5.8       MPV 10.9       10.3       Neut # 5.38       6.37       Neut % 70.6       83.6       NITRITE UA     Negative         nRBC 0.0       0.0       Occult Blood UA     Trace-Intact         Opiate Scrn, Ur     Negative         pH, UA     6.0         pH, Urine     6.0         Platelets 261       257       Potassium 3.9       4.3       PROTEIN TOTAL       8.3       Protein, UA     Negative         Protime       9.9       RBC 5.04       5.62       RDW 13.5       13.3       Sodium 135       138       Specific Gravity,UA     >=1.030         Thyroid Stimulating Hormone       4.380       Triglycerides       52       Troponin I       <0.012       Urobilinogen, UA     1.0         Vitamin B-12   334           WBC 7.62       7.61               Significant Imaging: I have reviewed all pertinent imaging results/findings within the past 24 hours.    Assessment and Plan:     Stroke  Stroke    Etiology likely  small vessel disease (exacerbated by medical noncompliance)  Lovenox 40 mg Daily  Continue aspirin 81 mg daily  Needs therapy eval  Will need assistance with medications at discharge         Antithrombotics for secondary stroke prevention: Antiplatelets: Aspirin: 81 mg daily    Statins for secondary stroke prevention and hyperlipidemia, if present:   Statins: Atorvastatin- 40 mg daily    Aggressive risk factor modification: HTN, HLD, VIRI s/p stent, etoh abuse     Rehab efforts: The patient has been evaluated by a stroke team provider and the therapy needs have been fully considered based off the presenting complaints and exam findings. The following therapy evaluations are needed: PT evaluate and treat, OT evaluate and treat, SLP evaluate and treat    Diagnostics ordered/pending: TTE to assess cardiac function/status     Stroke workup  MRI brain: left internal capsule posterior limb acute infarct  CTA head and neck:   LDL: 100.6  A1c: 4.7  TSH: 4.380  CTA head and neck:   1.  Calcified plaques bilateral proximal internal carotid arteries without hemodynamically significant stenosis.  2.  Right internal carotid artery cavernous portion 50-60% stenosis      VTE prophylaxis: Enoxaparin 40 mg SQ every 24 hours    BP parameters: Infarct: No intervention, SBP <140 (GOAL BP)            VTE Risk Mitigation (From admission, onward)         Ordered     enoxaparin injection 40 mg  Every 24 hours         06/15/23 1159     Place sequential compression device  Until discontinued         06/14/23 1646     IP VTE LOW RISK PATIENT  Once         06/14/23 1646                Thank you for your consult. Further recommendations to follow from MD Nieves Marquez, NP  Neurology  [facility] - [facility] ICU

## 2023-06-15 NOTE — PROCEDURES
Speech Language Pathology Department  Modified Barium Swallow (MBS) Study    Patient Name:  Wallace Lowry   MRN:  [id number]    Recommendations:     General recommendations:  dysphagia therapy and Speech/Language and Cognitive Evaluation  Repeat MBS study: 10-14 days  Diet recommendations:  Puree Diet - IDDSI Level 4, Liquid Diet Level: Mildly thick liquids - IDDSI Level 2   Swallow strategies/precautions: small bites/sips, slow rate, NO straws, assist with feeding as needed, and medications crushed in puree  General precautions: Standard, aspiration    History/Reason for Referral:     Wallace Lowry is a/n 67 y.o. male admitted on 6/14 with hypertension, right facial droop, slurred speech, and right upper and lower extremity weakness.  Symptom onset was 6/13.  CVA workup was initiated and MRI results are pending.  Pt failed nursing swallow screen.  Pt is unable to provide information regarding any deficits from previous CVA due to emotional state.    Past Medical History:   Diagnosis Date    Hypertension     Stroke      A MBS Study was completed at the bedside to assess the efficiency of his swallow function, rule out aspiration and make recommendations regarding safe dietary consistencies, effective compensatory strategies, and safe eating environment as signs/sx of dysphagia were identified during a clinical swallow evaluation.    Home Diet:  unknown  Current Method of Nutrition: NPO    Subjective:     Patient awake, alert, and labile.    Spiritual/Cultural/Faith Beliefs/Practices that affect care: no  Pain/Comfort: Pain Rating 1: 0/10    Respiratory Status: room air  Restraints/positioning devices: none    Fluoroscopic Results:     Oral Musculature  Dentition: missing teeth and teeth in poor condition  Secretion Management: adequate  Mucosal Quality: good  Facial Movement: reduced right  Buccal Strength & Mobility: impaired  Mandibular Strength & Mobility: WFL  Oral Labial Strength & Mobility: impaired  retraction, impaired pursing, and impaired seal  Lingual Strength & Mobility: impaired strength and impaired right lateral movement  Velar Elevation: WFL  Vocal Quality: strained/strangled    Positioning: upright in bed  Visualization  Patient was seen in the lateral view    Oral Phase:   Reduced lip closure  Reduced lip closure around utensil/straw  Prolonged/disorganized bolus formation  Disorganized lingual movement  Poor anterior-posterior transport  Inadequate mastication  Tongue pumping  Poor bolus cohesion  Loss of bolus control with liquids    Pharyngeal Phase:   Swallow delay with spill to the pyriform sinuses  Reduced base of tongue retraction  Adequate epiglottic deflection  Reduced hyolaryngeal excursion  Poor airway protection  Consistency Laryngeal Penetration Aspiration Residue   Mildly thick liquid by spoon None None Mild  Pyriform sinus   Puree None None None   Thin liquid by cup During the swallow  Did NOT clear None Mild-moderate  Pyriform sinus   Thin liquid by straw During the swallow After the swallow  Cough response present/NOT productive Trace  Pyriform sinus   Mildly thick liquid by cup None None Mild  Pyriform sinus     Cervical Esophageal Phase:   UES appeared to accommodate all bolus types without stasis or retrograde movement visualized    Compensatory Strategies:  Pt with moderate difficulty initiating an additional swallow to clear pharyngeal residue    Assessment:     Pt exhibited moderate-severe oral and mild pharyngeal dysphagia characterized by the findings noted above.  Aspiration of thin liquids was visualized and pt's cough response was NOT effective for clearing contrast material from the trachea.  Both swallow safety and efficiency are impaired.     Patient appears to be at high risk for aspiration related pneumonia when considering poor oral health status, overall health/immune status, reduced laryngeal vestibule closure, and severity of dysphagia.  Prognosis for behavioral  swallow rehabilitation is fair.    Goals:     Multidisciplinary Problems       SLP Goals          Problem: SLP    Goal Priority Disciplines Outcome   SLP Goal     SLP Ongoing, Progressing   Description:   LTG: Tolerate least restrictive PO diet with no clinical signs/sx aspiration  STGs:  1.  Pt will tolerate 2oz of ice chips by spoon with no clinical signs/sx aspiration.   2.  Complete lingual, labial, base of tongue, and laryngeal strengthening exercises with minimal cues  3.  Tolerate thermal stimulation to the anterior faucial pillars with 100% effortful swallow responses and delay less than 2 seconds.                       Patient Education:     Patient provided with verbal education regarding MBS results/recommendations.  Understanding was verbalized, however additional teaching warranted.    Plan:     SLP Follow-Up:  Yes    Patient to be seen:  5 x/week   Plan of Care expires:  06/29/23  Plan of Care reviewed with:  patient     Time Tracking:     SLP Treatment Date:   06/15/23  Speech Start Time:  1440  Speech Stop Time:  1500     Speech Total Time (min):  20 min    Billable minutes:   Motion Fluoroscopic Evaluation, Video Recording, 20 minutes     06/15/2023

## 2023-06-15 NOTE — ASSESSMENT & PLAN NOTE
Stroke    Etiology likely small vessel disease (exacerbated by medical noncompliance)  Lovenox 40 mg Daily  Continue aspirin 81 mg daily  Needs therapy eval  Will need assistance with medications at discharge         Antithrombotics for secondary stroke prevention: Antiplatelets: Aspirin: 81 mg daily    Statins for secondary stroke prevention and hyperlipidemia, if present:   Statins: Atorvastatin- 40 mg daily    Aggressive risk factor modification: HTN, HLD, VIRI s/p stent, etoh abuse     Rehab efforts: The patient has been evaluated by a stroke team provider and the therapy needs have been fully considered based off the presenting complaints and exam findings. The following therapy evaluations are needed: PT evaluate and treat, OT evaluate and treat, SLP evaluate and treat    Diagnostics ordered/pending: TTE to assess cardiac function/status     Stroke workup  MRI brain: left internal capsule posterior limb acute infarct  CTA head and neck:   LDL: 100.6  A1c: 4.7  TSH: 4.380  CTA head and neck:   1.  Calcified plaques bilateral proximal internal carotid arteries without hemodynamically significant stenosis.  2.  Right internal carotid artery cavernous portion 50-60% stenosis      VTE prophylaxis: Enoxaparin 40 mg SQ every 24 hours    BP parameters: Infarct: No intervention, SBP <140 (GOAL BP)

## 2023-06-15 NOTE — SUBJECTIVE & OBJECTIVE
Past Medical History:   Diagnosis Date    Hypertension     Stroke        No past surgical history on file.    Review of patient's allergies indicates:   Allergen Reactions    Shellfish derived        Current Neurological Medications:     Current Facility-Administered Medications on File Prior to Encounter   Medication    [DISCONTINUED] niCARdipine (CARDENE) 0.2 mg/mL in sodium chloride 0.9% 250 mL infusion    [DISCONTINUED] niCARdipine 40 mg/200 mL (0.2 mg/mL) infusion    [DISCONTINUED] niCARdipine 40 mg/200 mL (0.2 mg/mL) infusion     No current outpatient medications on file prior to encounter.     Family History    None       Tobacco Use    Smoking status: Not on file    Smokeless tobacco: Not on file   Substance and Sexual Activity    Alcohol use: Not on file    Drug use: Not on file    Sexual activity: Not on file     Review of Systems   Unable to perform ROS: Acuity of condition (limited information 2/2 dysarthria/aphasia)   Objective:     Vital Signs (Most Recent):  Temp: 98.1 °F (36.7 °C) (06/15/23 0800)  Pulse: 104 (06/15/23 0900)  Resp: 17 (06/15/23 0900)  BP: (!) 168/105 (06/15/23 0900)  SpO2: 97 % (06/15/23 0900) Vital Signs (24h Range):  Temp:  [98.1 °F (36.7 °C)-98.6 °F (37 °C)] 98.1 °F (36.7 °C)  Pulse:  [] 104  Resp:  [13-25] 17  SpO2:  [96 %-100 %] 97 %  BP: (132-204)/() 168/105     Weight: 53.5 kg (117 lb 15.1 oz)  Body mass index is 20.25 kg/m².     Physical Exam  Vitals and nursing note reviewed.   Constitutional:       General: He is not in acute distress.     Appearance: He is not toxic-appearing.   HENT:      Head: Normocephalic.   Eyes:      General: No visual field deficit.     Pupils: Pupils are equal, round, and reactive to light.   Pulmonary:      Effort: Pulmonary effort is normal.   Musculoskeletal:         General: Normal range of motion.      Cervical back: Normal range of motion.      Right lower leg: No edema.      Left lower leg: No edema.   Skin:     General: Skin is  warm and dry.      Capillary Refill: Capillary refill takes less than 2 seconds.   Neurological:      Mental Status: He is alert.      Cranial Nerves: Dysarthria and facial asymmetry (mild right facial droop) present.      Sensory: No sensory deficit.      Motor: Weakness (RUE, RLE weakness) present. No tremor, atrophy, abnormal muscle tone or seizure activity.      Comments: Oriented to self, delayed responses   Slowed dysarthric speech  Able to repeat phrases  Follow commands  Right sided weakness, RUE 3/5, RLE 3+/5     Psychiatric:         Attention and Perception: Attention normal.         Mood and Affect: Affect is labile and tearful.         Speech: Speech is delayed and slurred.         Behavior: Behavior is slowed. Behavior is cooperative.        NEUROLOGICAL EXAMINATION:     MENTAL STATUS   Speech: slurred     CRANIAL NERVES     CN III, IV, VI   Pupils are equal, round, and reactive to light.    Significant Labs:   Recent Lab Results         06/15/23  0104   06/14/23  1656   06/14/23  1330   06/14/23  1207        Phencyclidine     Negative         Albumin/Globulin Ratio       1.4       Albumin       4.9       Alkaline Phosphatase       77       ALT       24       Amphetamine Screen, Ur     Negative         Anion Gap 11.0       11.0       Appearance, UA     Clear         aPTT       23.7  Comment: For Minimal Heparin Infusion, the goal aPTT 64-85 seconds corresponds to an anti-Xa of 0.3-0.5.    For Low Intensity and High Intensity Heparin, the goal aPTT  seconds corresponds to an anti-Xa of 0.3-0.7       AST       37       Barbiturate Screen, Ur     Negative         Baso # 0.06       0.04       Basophil % 0.8       0.5       Benzodiazepine Screen, Urine     Negative         BILIRUBIN TOTAL       1.2       Bilirubin, UA     Small         BUN 12.8       19.0       BUN/CREAT RATIO 14       17       Calcium 8.6       9.2       Cannabinoids, Urine     Negative         Chloride 101       104       Cholesterol        219       CO2 23       23       Cocaine (Metab.)     Negative         Color, UA     Yellow         Creatinine 0.93       1.14       eGFR >60       71  Comment:                      EGFR INTERPRETATION    Beginning 8/15/22 we are reporting the eGFRcr calculation as recommended by the National Kidney Foundation. The eGFRcr equation has similar overall performance characteristics to the older equation, but the values may differ by more than 10% particularly at higher values of eGFRcr and younger adult ages.    NKF stages of chronic kidney disease (CKD)  Stage 1: Kidney damage with normal or increased eGFR (>90 mL/min/1.73 m^2)  Stage 2: Mild reduction in GFR (60-89 mL/min/1.73 m^2)  Stage 3a: Moderate reduction in GFR (45-59 mL/min/1.73 m^2)  Stage 3b: Moderate reduction in GFR (30-44 mL/min/1.73 m^2)  Stage 4: Severe reduction in GFR (15-29 mL/min/1.73 m^2)  Stage 5: Kidney failure (GFR <15 mL/min/1.73 m^2)           Eos # 0.05       0.02       Eosinophil % 0.7       0.3       Estimated Avg Glucose 88.2             Globulin, Total       3.4       Glucose 88       91       Glucose, UA     Negative         HDL       86       Hematocrit 44.9       49.9       Hemoglobin 15.7       17.8       Hemoglobin A1C External 4.7             HIV   Nonreactive           Immature Grans (Abs) 0.03       0.02       Immature Granulocytes 0.4       0.3       INR       0.97       Ketones, UA     15         LDL Cholesterol Direct       100.6       Leukocytes, UA     Negative         Lymph # 1.51       0.72       LYMPH % 19.8       9.5       MCH 31.2       31.7       MCHC 35.0       35.7       MCV 89.1       88.8       Methadone, Urine     Negative         Mono # 0.59       0.44       Mono % 7.7       5.8       MPV 10.9       10.3       Neut # 5.38       6.37       Neut % 70.6       83.6       NITRITE UA     Negative         nRBC 0.0       0.0       Occult Blood UA     Trace-Intact         Opiate Scrn, Ur     Negative         pH, UA      6.0         pH, Urine     6.0         Platelets 261       257       Potassium 3.9       4.3       PROTEIN TOTAL       8.3       Protein, UA     Negative         Protime       9.9       RBC 5.04       5.62       RDW 13.5       13.3       Sodium 135       138       Specific Gravity,UA     >=1.030         Thyroid Stimulating Hormone       4.380       Triglycerides       52       Troponin I       <0.012       Urobilinogen, UA     1.0         Vitamin B-12   334           WBC 7.62       7.61               Significant Imaging: I have reviewed all pertinent imaging results/findings within the past 24 hours.

## 2023-06-15 NOTE — H&P
[facility]  Hospital Medicine History & Physical Examination       Patient Name: Wallace Lowry  MRN: [id number]  Patient Class: IP- Inpatient   Admission Date: 6/14/2023   Admitting Physician: Gabriel Mathews MD  Length of Stay: 1  Attending Physician: Lilia Woods MD   Primary Care Provider: Primary Doctor No  Face-to-Face encounter date: 06/15/2023  Code Status:Full code   Chief Complaint: No chief complaint on file.        Patient information was obtained from patient, patient's family, past medical records and ER records.     HISTORY OF PRESENT ILLNESS:   Wallace Lowry is a 67 y.o. male with a past medical history of essential hypertension, hyperlipidemia, CVA, carotid artery stenosis, and alcohol abuse presented to [facility] on 6/14/2023 transferred from [facility] for CVA.  Patient presented to outside facility for right-sided weakness, right-sided facial droop, and slurred speech.  Patient was hypertensive with BP of 225/153.  CT head was negative for acute hemorrhage.  Patient was placed on Cardene drip and transferred to [facility] ICU for further medical management.  Neurology was consulted and recommended Lovenox 40 mg daily, aspirin 81 mg daily, atorvastatin 40 mg daily, and SBP less than 140.  CTA head and neck revealed calcified plaques at bilateral proximal internal carotid arteries without hemodynamically significant stenosis and 50-60% stenosis of the right internal carotid artery cavernous portion.  MRI brain without contrast revealed left internal capsule posterior limb acute nonhemorrhagic infarct.  Cleviprex was discontinued.  Patient was cleared for downgrade out of ICU on 06/15/2023.  Hospital medicine was consulted for transition of care and further medical management.    PAST MEDICAL HISTORY:   Essential hypertension  Hyperlipidemia  CVA   Carotid artery stenosis   Alcohol abuse    PAST SURGICAL HISTORY:   Carotid stent (R)    ALLERGIES:   Shellfish  derived    FAMILY HISTORY:   Reviewed and negative    SOCIAL HISTORY:   Former smoker-quit 9 months ago   Daily alcohol use-reports drinking 4-6 beers daily  Denies illicit drug use    HOME MEDICATIONS:     Prior to Admission medications    Not on File       REVIEW OF SYSTEMS:   Except as documented, all other systems reviewed and negative     PHYSICAL EXAM:     VITAL SIGNS: 24 HRS MIN & MAX LAST   Temp  Min: 98.1 °F (36.7 °C)  Max: 98.6 °F (37 °C) 98.1 °F (36.7 °C)   BP  Min: 132/94  Max: 204/110 (!) 168/105   Pulse  Min: 70  Max: 141  104   Resp  Min: 13  Max: 25 17   SpO2  Min: 96 %  Max: 100 % 97 %       General appearance: Male  in no apparent distress.  HEENNT: Atraumatic head.   Lungs: Clear to auscultation bilaterally.   Heart: Regular rate and rhythm.    Abdomen: Soft, non-distended, non-tender. Bowel sounds are normal.   Extremities: No cyanosis, clubbing, or edema. No deformities.   Skin: No Rash. Warm and dry.   Neuro: Awake, alert, and oriented.  Dysarthric speech.  Expressive aphasia,  Right facial droop.  Right upper extremity right lower extremity weakness 2/5 strength  Psych/mental status: Appropriate mood and affect. Responds appropriately to questions.     LABS AND IMAGING:     Recent Labs   Lab 06/14/23  1207 06/15/23  0104   WBC 7.61 7.62   RBC 5.62 5.04   HGB 17.8 15.7   HCT 49.9 44.9   MCV 88.8 89.1   MCH 31.7 31.2*   MCHC 35.7 35.0   RDW 13.3 13.5    261   MPV 10.3 10.9*       Recent Labs   Lab 06/14/23  1207 06/15/23  0104    135*   K 4.3 3.9   CO2 23 23   BUN 19.0 12.8   CREATININE 1.14 0.93   CALCIUM 9.2 8.6*   ALBUMIN 4.9  --    ALKPHOS 77  --    ALT 24  --    AST 37  --    BILITOT 1.2*  --        Microbiology Results (last 7 days)       ** No results found for the last 168 hours. **             MRI Brain Without Contrast  Narrative: EXAMINATION:  MRI BRAIN WITHOUT CONTRAST    CLINICAL HISTORY:  Mental status change, unknown cause;    TECHNIQUE:  Multiplanar MRI sequences  were performed of the brain without contrast.    COMPARISON:  CT brain June 14, 2023    FINDINGS:  The posterior limb of the left internal capsule is remarkable for acute nonhemorrhagic infarct with diffusion weighted brightness, signal dropout on ADC map and T2 FLAIR hyperintensity on image 16 series 4.  There are several old lacunar infarcts which involve bilateral thalami, basal ganglia, right corona radiata and bilateral centrum semiovale.  There is also pontine old lacunar infarct.  There is severe cerebral leukoencephalopathy with extensive periventricular and deep white matter T2 FLAIR hyperintense signals which likely is caused by chronic microangiopathic ischemia.  There is generalized cerebral and cerebellar cortical volume loss.  Gradient echo sequences show multiple infratentorial and supratentorial punctate randomly scattered dephasing artifacts related to old hemorrhagic byproducts.    The cerebellar tonsils are normally positioned. There is no acute intracranial hemorrhage, hydrocephalus, midline shift or mass effect. No acute extra axial fluid collections identified.  The right mastoid air cells hyperintense inflammatory signals..  Impression: 1.  Left internal capsule posterior limb acute nonhemorrhagic infarct.    2.  Several old infarcts, severe chronic microangiopathic ischemia and atrophy.    Electronically signed by: Jamie Mccullough  Date:    06/15/2023  Time:    10:18        ASSESSMENT & PLAN:   Assessment:  Acute CVA- left internal capsule posterior limb acute nonhemorrhagic infarct  Carotid artery stenosis   Essential hypertension  Hyperlipidemia   Alcohol abuse    Plan:  Neurochecks q.4  BP parameters per neurology  Amlodipine 10 mg daily   IV PRN anti hypersensitives  Neurology following, appreciate recommendations  PT/OT/SLP  CIWA q 4 hours  IV Ativan PRN  Multivitamin, folate, and thiamine daily  Continue appropriate home medications once med rec updated   Labs in a.m.    VTE Prophylaxis:   Lovenox    __________________________________________________________________________  INPATIENT LIST OF MEDICATIONS     Scheduled Meds:   aspirin  81 mg Oral Daily    atorvastatin  40 mg Oral Daily    enoxparin  40 mg Subcutaneous Q24H (prophylaxis, 1700)    folic acid (FOLVITE) IVPB  1 mg Intravenous Daily    multivitamin  1 tablet Oral Daily    mupirocin   Nasal BID    thiamine (VITAMIN B1) IVPB  500 mg Intravenous TID    Followed by    [START ON 6/18/2023] thiamine (VITAMIN B1) IVPB  250 mg Intravenous Daily     Continuous Infusions:   lactated ringers 100 mL/hr at 06/15/23 1046     PRN Meds:.labetalol, lorazepam, sodium chloride 0.9%      ICaden PA-C, have reviewed and discussed the case with Dr. Lilia Woods MD   Please see the following addendum for further assessment and plan from there attending MD.    06/15/2023    ________________________________________________________________________________    MD Addendum:  I, Dr. Woods assumed care of this patient today at around 2:30 p.m.  For the patient encounter, I performed the substantive portion of the visit, I reviewed the PA documentation, treatment plan, and medical decision making.  I had face to face time with this patient     A. History:    67-year-old male with significant history of HTN, HLD, prior CVA with unknown residual deficit, carotid artery stenosis status post right-sided carotid stent, former smoker quit 9 months back, alcohol abuse presented to the hospital as a transfer from [facility] facility for acute CVA.  Patient went to bed normal on 06/12 and woke up on 06/13 with acute right-sided weakness, facial droop, slurry speech.  Patient was admitted to our ICU on 06/14.  Patient was severely hypertensive with systolic in 200s.  CT head was negative.  Initiated Cardene drip.  Neurology services consulted.  Stroke workup ordered.  MRI brain confirmed left internal capsule posterior limb acute infarct.  CT angiogram with no  hemodynamically significant stenosis in internal carotid, right ICA 50-60% stenosis.  Echocardiogram pending paid neurology recommended aspirin, statin.  Weaned off Cardene drip and downgraded to hospitalist medicine service on 06/15.    B. Physical exam:  Agree with above    C. Medical decision making:    Acute ischemic CVA-left internal capsule posterior limb infarct   Right-sided weakness/dysarthria/expressive aphasia/facial droop secondary to above  Hypertensive emergency on admit  HLD   History of carotid artery stenosis status post right-sided carotid stent placement   Former tobacco use  Alcohol abuse  Medical noncompliance  Prophylaxis    Neurology on board  Recommending low-dose aspirin, statin for now   Off permissive window   Will need tighter control of blood pressure   Goal is less than 140/90   P.r.n. labetalol, hydralazine, clonidine ordered  Scheduled amlodipine and labetalol   Will make adjustments in scheduled antihypertensives based on subsequent BP   Obtain ultrasound carotid  CT angio showed 50-60% stenosis on right ICA, patient has had previous stenting in the past   Echocardiogram is pending   No signs of alcohol withdrawal   Continue multivitamin, folic acid, thiamine  Cleared for p.o. diet by speech   Continue PT/ST/OT   Keep IV fluids until tomorrow, can discontinue oral intake is adequate   DVT prophylaxis-Lovenox    Critical care time-45 minutes  Critical care diagnosis-hypertensive emergency requiring p.r.n. IV antihypertensives   Critical care interventions: Hands-on evaluation, review of labs/radiographs/records and discussions with patient         All diagnosis and differential diagnosis have been reviewed; assessment and plan has been documented; I have personally reviewed the labs and test results that are presently available; I have reviewed the patients medication list; I have reviewed the consulting providers response and recommendations. I have reviewed or attempted to review  medical records based upon their availability.    All of the patient and family questions have been addressed and answered. Patient's is agreeable to the above stated plan. I will continue to monitor closely and make adjustments to medical management as needed.      06/15/2023

## 2023-06-15 NOTE — PLAN OF CARE
Problem: SLP  Goal: SLP Goal  Description:   LTG: Tolerate least restrictive PO diet with no clinical signs/sx aspiration  STGs:  1.  Pt will tolerate 2oz of ice chips by spoon with no clinical signs/sx aspiration.   2.  Complete lingual, labial, base of tongue, and laryngeal strengthening exercises with minimal cues  3.  Tolerate thermal stimulation to the anterior faucial pillars with 100% effortful swallow responses and delay less than 2 seconds.    Outcome: Ongoing, Progressing

## 2023-06-15 NOTE — HPI
67 year old male with a past medical history of HTN, HLD, prior CVA (unknown deficits), VIRI s/p R carotid stent, former smoker (quit smoking 9 months ago), and alcohol abuse presented to [facility] as a transfer from [facility] for stroke. Per EMR last known normal 6/12. He woke up on 6/13 with right sided weakness, facial droop, and slurred speech. He was brought to ED on 6/14 for further evaluation. He reported he was not compliant with medications. Upon arrival at [facility], /153. CT head was negative for acute intracranial abnormalities. He was transferred to [facility] for hypertensive crisis and further workup. Neurology was consulted for stroke workup.

## 2023-06-15 NOTE — PT/OT/SLP PROGRESS
Physical Therapy      Patient Name:  Wallace Lowry   MRN:  [id number]    Patient not seen today for PT eval. Spoke to RN who reported that pt is with increased BP at this time and she does not have any other meds to administer at this time. PT to defer session today and will follow-up tomorrow/as appropriate.

## 2023-06-15 NOTE — PT/OT/SLP PROGRESS
Occupational Therapy      Patient Name:  Wallace Lowry   MRN:  [id number]    OT consult received. Eval attempted, however, patient not seen today secondary to HTN. /125 (goal <140 per neuro notes) . Educated pt on role of OT and assisted RN with repositioning in bed for pressure injury prevention. Will follow-up for OT eval as appropriate.    6/15/2023

## 2023-06-15 NOTE — PT/OT/SLP EVAL
Speech Language Pathology Department  Clinical Swallow Evaluation    Patient Name:  Wallace Lowry   MRN:  [id number]    Recommendations:     General recommendations:  Modified Barium Swallow Study  Diet recommendations:  NPO,     Swallow strategies/precautions: medications crushed in puree  Precautions: Standard, aspiration    History:     Wallace Lowry is a/n 67 y.o. male admitted on 6/14 with hypertension, right facial droop, slurred speech, and right upper and lower extremity weakness.  Symptom onset was 6/13.  CVA workup was initiated and MRI results are pending.  Pt failed nursing swallow screen.  Pt is unable to provide information regarding any deficits from previous CVA due to emotional state.    Past Medical History:   Diagnosis Date    Hypertension     Stroke      Home Diet:  unknown  Current Method of Nutrition: NPO    Subjective     Patient awake, alert, cooperative, and tearful .    Patient goals: to eat/drink     Spiritual/Cultural/Anabaptism Beliefs/Practices that affect care: no  Pain/Comfort: Pain Rating 1: 0/10    Respiratory status: room air  Restraints/positioning devices: none    Objective:     ORAL MUSCULATURE  Dentition: missing teeth and teeth in poor condition  Secretion Management: adequate  Mucosal Quality: good  Facial Movement: reduced right  Buccal Strength & Mobility: impaired  Mandibular Strength & Mobility: WFL  Oral Labial Strength & Mobility: impaired retraction, impaired pursing, and impaired seal  Lingual Strength & Mobility: impaired strength and impaired right lateral movement  Velar Elevation: unable to assess  Vocal Quality: strained/strangled    Consistency Fed By Oral Symptoms Pharyngeal Symptoms   Ice chips SLP Reduced closure around utensil/straw  Slowed oral transit time Reduced laryngeal movement to palpation  Multiple swallows  Wet vocal quality after swallow   Thin liquid by spoon SLP None None   Thin liquid by cup Self None Multiple swallows  Wet vocal quality  after swallow   Thin liquid by straw SLP Reduced closure around utensil/straw  Weak sucking Multiple swallows  Wet vocal quality after swallow   Puree SLP Slowed oral transit time Multiple swallows     Assessment:     Pt presents with signs/sx oropharyngeal dysphagia warranting comprehensive assessment of swallow function to determine safety of PO intake.    Patient Education:     Patient provided with verbal education regarding SLP POC.  Understanding was verbalized.    Plan:     Plan of Care reviewed with:  patient      Time Tracking:     SLP Treatment Date:   06/15/23  Speech Start Time:  0840  Speech Stop Time:  0855     Speech Total Time (min):  15 min    Billable minutes:  Swallow and Oral Function Evaluation, 15 minutes     06/15/2023

## 2023-06-16 LAB
AV INDEX (PROSTH): 0.91
AV MEAN GRADIENT: 3 MMHG
AV PEAK GRADIENT: 5 MMHG
AV REGURGITATION PRESSURE HALF TIME: 295 MS
AV VALVE AREA: 2.85 CM2
AV VELOCITY RATIO: 0.85
BSA FOR ECHO PROCEDURE: 1.55 M2
CV ECHO LV RWT: 0.6 CM
DOP CALC AO PEAK VEL: 1.14 M/S
DOP CALC AO VTI: 15 CM
DOP CALC LVOT AREA: 3.1 CM2
DOP CALC LVOT DIAMETER: 2 CM
DOP CALC LVOT PEAK VEL: 0.97 M/S
DOP CALC LVOT STROKE VOLUME: 42.7 CM3
DOP CALC MV VTI: 12.4 CM
DOP CALCLVOT PEAK VEL VTI: 13.6 CM
E WAVE DECELERATION TIME: 106 MSEC
E/A RATIO: 0.93
E/E' RATIO: 7.38 M/S
ECHO LV POSTERIOR WALL: 1 CM (ref 0.6–1.1)
EJECTION FRACTION: 65 %
FRACTIONAL SHORTENING: 38 % (ref 28–44)
INTERVENTRICULAR SEPTUM: 1.55 CM (ref 0.6–1.1)
LEFT ATRIUM SIZE: 3.3 CM
LEFT ATRIUM VOLUME INDEX MOD: 21.8 ML/M2
LEFT ATRIUM VOLUME MOD: 34 CM3
LEFT CCA DIST DIAS: 7 CM/S
LEFT CCA DIST SYS: 86 CM/S
LEFT CCA PROX DIAS: 18 CM/S
LEFT CCA PROX SYS: 64 CM/S
LEFT ECA DIAS: 0 CM/S
LEFT ECA SYS: 62 CM/S
LEFT ICA DIST DIAS: 16 CM/S
LEFT ICA DIST SYS: 50 CM/S
LEFT ICA MID DIAS: 16 CM/S
LEFT ICA MID SYS: 57 CM/S
LEFT ICA PROX DIAS: 10 CM/S
LEFT ICA PROX SYS: 47 CM/S
LEFT INTERNAL DIMENSION IN SYSTOLE: 2.09 CM (ref 2.1–4)
LEFT VENTRICLE DIASTOLIC VOLUME INDEX: 29.36 ML/M2
LEFT VENTRICLE DIASTOLIC VOLUME: 45.8 ML
LEFT VENTRICLE MASS INDEX: 90 G/M2
LEFT VENTRICLE SYSTOLIC VOLUME INDEX: 9.1 ML/M2
LEFT VENTRICLE SYSTOLIC VOLUME: 14.2 ML
LEFT VENTRICULAR INTERNAL DIMENSION IN DIASTOLE: 3.35 CM (ref 3.5–6)
LEFT VENTRICULAR MASS: 140.2 G
LEFT VERTEBRAL DIAS: 9 CM/S
LEFT VERTEBRAL SYS: 35 CM/S
LV LATERAL E/E' RATIO: 6.69 M/S
LV SEPTAL E/E' RATIO: 8.23 M/S
LVOT MG: 2 MMHG
LVOT MV: 0.63 CM/S
MV MEAN GRADIENT: 3 MMHG
MV PEAK A VEL: 1.15 M/S
MV PEAK E VEL: 1.07 M/S
MV PEAK GRADIENT: 6 MMHG
MV VALVE AREA BY CONTINUITY EQUATION: 3.44 CM2
OHS CV CAROTID RIGHT ICA EDV HIGHEST: 17
OHS CV CAROTID ULTRASOUND LEFT ICA/CCA RATIO: 0.66
OHS CV CAROTID ULTRASOUND RIGHT ICA/CCA RATIO: 1
OHS CV PV CAROTID LEFT HIGHEST CCA: 86
OHS CV PV CAROTID LEFT HIGHEST ICA: 57
OHS CV PV CAROTID RIGHT HIGHEST CCA: 78
OHS CV PV CAROTID RIGHT HIGHEST ICA: 48
OHS CV US CAROTID LEFT HIGHEST EDV: 16
OHS LV EJECTION FRACTION SIMPSONS BIPLANE MOD: 6 %
PISA AR MAX VEL: 3.66 M/S
PISA TR MAX VEL: 1.04 M/S
PV PEAK VELOCITY: 1.56 CM/S
RIGHT CCA DIST DIAS: 8 CM/S
RIGHT CCA DIST SYS: 48 CM/S
RIGHT CCA PROX DIAS: 11 CM/S
RIGHT CCA PROX SYS: 78 CM/S
RIGHT ECA DIAS: 0 CM/S
RIGHT ECA SYS: 41 CM/S
RIGHT ICA DIST DIAS: 17 CM/S
RIGHT ICA DIST SYS: 48 CM/S
RIGHT ICA MID DIAS: 10 CM/S
RIGHT ICA MID SYS: 36 CM/S
RIGHT ICA PROX DIAS: 7 CM/S
RIGHT ICA PROX SYS: 27 CM/S
RIGHT VENTRICULAR END-DIASTOLIC DIMENSION: 1.57 CM
RIGHT VERTEBRAL DIAS: 6 CM/S
RIGHT VERTEBRAL SYS: 31 CM/S
TDI LATERAL: 0.16 M/S
TDI SEPTAL: 0.13 M/S
TDI: 0.15 M/S
TR MAX PG: 4 MMHG

## 2023-06-16 PROCEDURE — 97162 PT EVAL MOD COMPLEX 30 MIN: CPT

## 2023-06-16 PROCEDURE — 25000003 PHARM REV CODE 250: Performed by: STUDENT IN AN ORGANIZED HEALTH CARE EDUCATION/TRAINING PROGRAM

## 2023-06-16 PROCEDURE — 92523 SPEECH SOUND LANG COMPREHEN: CPT

## 2023-06-16 PROCEDURE — 63600175 PHARM REV CODE 636 W HCPCS: Performed by: INTERNAL MEDICINE

## 2023-06-16 PROCEDURE — 21400001 HC TELEMETRY ROOM

## 2023-06-16 PROCEDURE — 25000003 PHARM REV CODE 250: Performed by: INTERNAL MEDICINE

## 2023-06-16 PROCEDURE — 94761 N-INVAS EAR/PLS OXIMETRY MLT: CPT

## 2023-06-16 PROCEDURE — 97167 OT EVAL HIGH COMPLEX 60 MIN: CPT

## 2023-06-16 PROCEDURE — 63600175 PHARM REV CODE 636 W HCPCS: Performed by: STUDENT IN AN ORGANIZED HEALTH CARE EDUCATION/TRAINING PROGRAM

## 2023-06-16 PROCEDURE — 63600175 PHARM REV CODE 636 W HCPCS: Performed by: NURSE PRACTITIONER

## 2023-06-16 PROCEDURE — 97530 THERAPEUTIC ACTIVITIES: CPT

## 2023-06-16 RX ORDER — FOLIC ACID 1 MG/1
1 TABLET ORAL DAILY
Status: DISCONTINUED | OUTPATIENT
Start: 2023-06-17 | End: 2023-06-22 | Stop reason: HOSPADM

## 2023-06-16 RX ORDER — THIAMINE HCL 100 MG
100 TABLET ORAL DAILY
Status: DISCONTINUED | OUTPATIENT
Start: 2023-06-17 | End: 2023-06-22 | Stop reason: HOSPADM

## 2023-06-16 RX ADMIN — LABETALOL HYDROCHLORIDE 200 MG: 200 TABLET, FILM COATED ORAL at 08:06

## 2023-06-16 RX ADMIN — LEUCINE, PHENYLALANINE, LYSINE, METHIONINE, ISOLEUCINE, VALINE, HISTIDINE, THREONINE, TRYPTOPHAN, ALANINE, GLYCINE, ARGININE, PROLINE, SERINE, TYROSINE, SODIUM ACETATE, DIBASIC POTASSIUM PHOSPHATE, MAGNESIUM CHLORIDE, SODIUM CHLORIDE, CALCIUM CHLORIDE, DEXTROSE
311; 238; 247; 170; 255; 247; 204; 179; 77; 880; 438; 489; 289; 213; 17; 297; 261; 51; 77; 33; 5 INJECTION INTRAVENOUS at 11:06

## 2023-06-16 RX ADMIN — SODIUM CHLORIDE, POTASSIUM CHLORIDE, SODIUM LACTATE AND CALCIUM CHLORIDE: 600; 310; 30; 20 INJECTION, SOLUTION INTRAVENOUS at 08:06

## 2023-06-16 RX ADMIN — THERA TABS 1 TABLET: TAB at 08:06

## 2023-06-16 RX ADMIN — ATORVASTATIN CALCIUM 40 MG: 40 TABLET, FILM COATED ORAL at 08:06

## 2023-06-16 RX ADMIN — LABETALOL HYDROCHLORIDE 10 MG: 5 INJECTION, SOLUTION INTRAVENOUS at 06:06

## 2023-06-16 RX ADMIN — FOLIC ACID 1 MG: 5 INJECTION, SOLUTION INTRAMUSCULAR; INTRAVENOUS; SUBCUTANEOUS at 08:06

## 2023-06-16 RX ADMIN — MUPIROCIN: 20 OINTMENT TOPICAL at 08:06

## 2023-06-16 RX ADMIN — LABETALOL HYDROCHLORIDE 300 MG: 100 TABLET, FILM COATED ORAL at 08:06

## 2023-06-16 RX ADMIN — AMLODIPINE BESYLATE 10 MG: 5 TABLET ORAL at 08:06

## 2023-06-16 RX ADMIN — THIAMINE HYDROCHLORIDE 500 MG: 100 INJECTION, SOLUTION INTRAMUSCULAR; INTRAVENOUS at 08:06

## 2023-06-16 RX ADMIN — ASPIRIN 81 MG CHEWABLE TABLET 81 MG: 81 TABLET CHEWABLE at 08:06

## 2023-06-16 RX ADMIN — MUPIROCIN: 20 OINTMENT TOPICAL at 09:06

## 2023-06-16 RX ADMIN — ENOXAPARIN SODIUM 40 MG: 40 INJECTION SUBCUTANEOUS at 04:06

## 2023-06-16 NOTE — PROGRESS NOTES
[facility]  Hospital Medicine Progress Note        Chief Complaint: Inpatient Follow-up    HPI:   67-year-old male with significant history of HTN, HLD, prior CVA with unknown residual deficit, carotid artery stenosis status post right-sided carotid stent, former smoker quit 9 months back, alcohol abuse presented to the hospital as a transfer from outlying facility for acute CVA.  Patient went to bed normal on 06/12 and woke up on 06/13 with acute right-sided weakness, facial droop, slurry speech.  Patient was admitted to our ICU on 06/14.  Patient was severely hypertensive with systolic in 200s.  CT head was negative.  Initiated Cardene drip.  Neurology services consulted.  Stroke workup ordered.  MRI brain confirmed left internal capsule posterior limb acute infarct.  CT angiogram with no hemodynamically significant stenosis in internal carotid, right ICA 50-60% stenosis.  Echocardiogram pending paid neurology recommended aspirin, statin.  Weaned off Cardene drip and downgraded to hospitalist medicine service on 06/15.  Neurology recommending low-dose aspirin, statin.  Will need tighter control of blood pressure. initiated scheduled amlodipine, labetalol.  CT angiogram showed 50-60% stenosis on right ICA, patient has had previous stenting in the past.  Echocardiogram pending.  Therapy Services following paid cleared for modified diet.    Interval Hx:   Patient seen at bedside.  Comfortably laying in bed.  Blood pressure much better today, at goal.  Still having trouble swallowing.  Oral intake is not adequate.  Participating with therapy services.    Objective/physical exam:  General: In no acute distress, afebrile  Chest: Clear to auscultation bilaterally  Heart: S1, S2, no appreciable murmur  Abdomen: Soft, nontender, BS +  MSK: Warm, no lower extremity edema, no clubbing or cyanosis  Neurologic: alert, awake, oriented.  Severe expressive aphasia, dysarthria, right-sided  weakness  VITAL SIGNS: 24 HRS MIN & MAX LAST   Temp  Min: 97.6 °F (36.4 °C)  Max: 98.4 °F (36.9 °C) 97.7 °F (36.5 °C)   BP  Min: 106/75  Max: 193/128 (!) 154/100   Pulse  Min: 76  Max: 105  92   Resp  Min: 14  Max: 31 17   SpO2  Min: 94 %  Max: 98 % 96 %       Recent Labs   Lab 06/15/23  0104   WBC 7.62   RBC 5.04   HGB 15.7   HCT 44.9   MCV 89.1   MCH 31.2*   MCHC 35.0   RDW 13.5      MPV 10.9*         Recent Labs   Lab 06/14/23  1207 06/15/23  0104    135*   K 4.3 3.9   CO2 23 23   BUN 19.0 12.8   CREATININE 1.14 0.93   CALCIUM 9.2 8.6*   ALBUMIN 4.9  --    ALKPHOS 77  --    ALT 24  --    AST 37  --    BILITOT 1.2*  --           Microbiology Results (last 7 days)       ** No results found for the last 168 hours. **             Scheduled Med:   amLODIPine  10 mg Oral Daily    aspirin  81 mg Oral Daily    atorvastatin  40 mg Oral Daily    enoxparin  40 mg Subcutaneous Q24H (prophylaxis, 1700)    folic acid (FOLVITE) IVPB  1 mg Intravenous Daily    labetaloL  300 mg Oral Q12H    multivitamin  1 tablet Oral Daily    mupirocin   Nasal BID    thiamine (VITAMIN B1) IVPB  500 mg Intravenous TID    Followed by    [START ON 6/18/2023] thiamine (VITAMIN B1) IVPB  250 mg Intravenous Daily          Assessment/Plan:    Acute ischemic CVA-left internal capsule posterior limb infarct   Right-sided weakness/dysarthria/expressive aphasia/facial droop secondary to above  Poorly-controlled HTN admitted with hypertensive emergency-improved  Positive bubble study  HLD   History of carotid artery stenosis status post right-sided carotid stent placement   Former tobacco use  Alcohol abuse  Medical noncompliance  Prophylaxis    Patient is on low-dose aspirin, statin as recommended by neurology   Ultrasound carotid with less than 50% stenosis bilaterally   Bubble study positive   I consulted Cardiology today  There planning for [facility] on Monday   NPO after midnight on Sunday   BP at goal on labetalol 300 mg b.i.d., amlodipine 10  mg daily  Continue aggressive PT/OT/ST  Patient still having significant dysphagia and oral intake remains inadequate   Initiated low-dose TPN   Continue multivitamin, thiamine, folic acid  DVT prophylaxis-Lovenox     Case management consult for inpatient rehab placement         Lilia Woods MD   06/16/2023

## 2023-06-16 NOTE — PLAN OF CARE
Problem: Adult Inpatient Plan of Care  Goal: Plan of Care Review  Outcome: Ongoing, Progressing  Goal: Patient-Specific Goal (Individualized)  Outcome: Ongoing, Progressing  Goal: Absence of Hospital-Acquired Illness or Injury  Outcome: Ongoing, Progressing  Goal: Optimal Comfort and Wellbeing  Outcome: Ongoing, Progressing  Goal: Readiness for Transition of Care  Outcome: Ongoing, Progressing     Problem: Adjustment to Illness (Stroke, Ischemic/Transient Ischemic Attack)  Goal: Optimal Coping  Outcome: Ongoing, Progressing     Problem: Bowel Elimination Impaired (Stroke, Ischemic/Transient Ischemic Attack)  Goal: Effective Bowel Elimination  Outcome: Ongoing, Progressing     Problem: Cerebral Tissue Perfusion (Stroke, Ischemic/Transient Ischemic Attack)  Goal: Optimal Cerebral Tissue Perfusion  Outcome: Ongoing, Progressing     Problem: Cognitive Impairment (Stroke, Ischemic/Transient Ischemic Attack)  Goal: Optimal Cognitive Function  Outcome: Ongoing, Progressing     Problem: Communication Impairment (Stroke, Ischemic/Transient Ischemic Attack)  Goal: Improved Communication Skills  Outcome: Ongoing, Progressing     Problem: Functional Ability Impaired (Stroke, Ischemic/Transient Ischemic Attack)  Goal: Optimal Functional Ability  Outcome: Ongoing, Progressing     Problem: Sensorimotor Impairment (Stroke, Ischemic/Transient Ischemic Attack)  Goal: Improved Sensorimotor Function  Outcome: Ongoing, Progressing     Problem: Swallowing Impairment (Stroke, Ischemic/Transient Ischemic Attack)  Goal: Optimal Eating and Swallowing without Aspiration  Outcome: Ongoing, Progressing     Problem: Urinary Elimination Impaired (Stroke, Ischemic/Transient Ischemic Attack)  Goal: Effective Urinary Elimination  Outcome: Met     Problem: Skin Injury Risk Increased  Goal: Skin Health and Integrity  Outcome: Ongoing, Progressing

## 2023-06-16 NOTE — CONSULTS
Inpatient consult to Cardiology  Consult performed by: NICK Goldberg  Consult ordered by: Lilia Woods MD      [facility] - [facility] ICU  Cardiology  Consult Note    Patient Name: Wallace Lowry  MRN: [id number]  Admission Date: 6/14/2023  Hospital Length of Stay: 2 days  Code Status: Full Code   Attending Provider: Lilia Woods MD   Consulting Provider: NICK Goldberg  Primary Care Physician: Primary Doctor No  Principal Problem:<principal problem not specified>    Patient information was obtained from patient, past medical records, and ER records.     Subjective:     Chief Complaint: Reason for Consult: Abnormal ECHO/CVA    HPI: Mr. Lowry is a 66 y/o male who is known to [facility], Dr. Newsome ([place]). The patient presented to [facility] on 6.14.23 with c/o R Sided Weakness, R Sided Facial Droop and Slurred Speech. His initial BP was 225/113 and a CT of the Head without Contrast revealed No Acute Intracranial Hemorrhage. He was placed on a Cardene Drip and transferred to [facility] ICU for Neurology Services. He had a CTA of the Head/Neck which revealed Calcified Plaques at Bilateral Prox ICAs without Hemodynamically Significant Stenossi and 50-60% Stenosis of the R ICA Cavernous Portion. A MRI of the Brain without contrast revealed L Internal Capsule Posterior Limb Acute Non-Hemorrhagic Infarct. He was downgraded to Hospital Medicine and ECHO results revealed a Shunt and for this reason [facility] was consulted for a ZEYAD.    PMH: HTN, HLD, CVA, VIRI/Bilaterally Mild, ETOH Abuse, Tobacco Abuse, COPD, Syncope, Anxiety   PSH: R Carotid Stenting   Family History: Reviewed and Unremarkable for Heart Disease  Social History: Denies Illicit Drug Use; + Tobacco Use, 1 PPD for 40 + Years; + ETOH Use Daily/Unknown Amount.    Previous Cardiac Diagnostics:   Carotid US 6.15.23:  The right internal carotid artery is patent with less than 50% stenosis.  The left internal carotid artery is patent with less than 50%  stenosis.   The bilateral vertebral arteries are patent with antegrade flow.    ECHO 6.15.23:  Study is positive for intercardiac shunt.  The left ventricle is normal in size with concentric remodeling and normal systolic function.  The estimated ejection fraction is 65%.  Indeterminate left ventricular diastolic function.  Normal right ventricular size.  Mild-to-moderate aortic regurgitation.    Stress ECHO 5.23.19:  Stress Testing Impression:  Stress EKG is non-diagnostic.   Sub-maximal exercise treadmill test (MPHR : 72 %).  The functional capacity is fair (7.1 METs).  The heart rate recovery is normal.   Stress ECHO Impression:  The study quality is good.   During peak exercise ejection fraction has improved from 60% to 70%.   Mild (1+) aortic regurgitation was noted during stress.    Carotid US 1.10.19:  The study quality is good.   1-39% stenosis in the proximal right internal carotid artery based on Bluth Criteria.   1-39% stenosis in the proximal left internal carotid artery based on Bluth Criteria.   Antegrade right vertebral artery flow.   Antegrade left vertebral artery flow.     ECHO 1.10.19:  The left ventricle is decreased in size.  Global left ventricular systolic function is normal.  The left ventricular ejection fraction is 60%. Left ventricular diastolic function is normal. Mild to moderate concentric left ventricular hypertrophy is present.  Dilatation of the aortic root is noted. Aortic root diameter is 4.0 cms.   Mild calcification of the aortic valve is noted with adequate cuspal excursion.   Mild to moderate (1-2+) aortic regurgitation. Mild (1+) mitral regurgitation.   The pulmonary artery systolic pressure is 22 mmHg.   right atrial echodensity likely prominent eustachian valve, consider ZEYAD if clinically indicated.    No past surgical history on file.    Review of patient's allergies indicates:   Allergen Reactions    Shellfish derived      No current facility-administered medications on  file prior to encounter.     Current Outpatient Medications on File Prior to Encounter   Medication Sig    amLODIPine (NORVASC) 10 MG tablet Take 10 mg by mouth nightly.    atorvastatin (LIPITOR) 80 MG tablet Take 80 mg by mouth once daily.    clopidogreL (PLAVIX) 75 mg tablet Take 75 mg by mouth once daily.    hydroCHLOROthiazide (HYDRODIURIL) 25 MG tablet Take 25 mg by mouth once daily.    metoprolol succinate (TOPROL-XL) 100 MG 24 hr tablet Take 100 mg by mouth once daily.     Review of Systems   Unable to perform ROS: Acuity of condition     Objective:     Vital Signs (Most Recent):  Temp: 97.9 °F (36.6 °C) (06/16/23 1200)  Pulse: 82 (06/16/23 1200)  Resp: 18 (06/16/23 1200)  BP: 117/75 (06/16/23 1200)  SpO2: (!) 90 % (06/16/23 1200) Vital Signs (24h Range):  Temp:  [97.6 °F (36.4 °C)-98.4 °F (36.9 °C)] 97.9 °F (36.6 °C)  Pulse:  [] 82  Resp:  [14-31] 18  SpO2:  [90 %-98 %] 90 %  BP: (106-190)/() 117/75     Weight: 53.5 kg (117 lb 15.1 oz)  Body mass index is 20.25 kg/m².    SpO2: (!) 90 %         Intake/Output Summary (Last 24 hours) at 6/16/2023 1341  Last data filed at 6/16/2023 1014  Gross per 24 hour   Intake 2537 ml   Output 500 ml   Net 2037 ml     Lines/Drains/Airways       Drain  Duration             Male External Urinary Catheter 06/16/23 1147 <1 day              Peripheral Intravenous Line  Duration                  Peripheral IV - Single Lumen 18 G Anterior;Distal;Left Antecubital -- days         Peripheral IV - Single Lumen 06/16/23 1101 18 G Left Hand <1 day                  Significant Labs:  Recent Results (from the past 72 hour(s))   POCT glucose    Collection Time: 06/14/23 11:36 AM   Result Value Ref Range    POCT Glucose 73 70 - 110 mg/dL   Comprehensive metabolic panel    Collection Time: 06/14/23 12:07 PM   Result Value Ref Range    Sodium Level 138 135 - 145 mmol/L    Potassium Level 4.3 3.5 - 5.1 mmol/L    Chloride 104 98 - 110 mmol/L    Carbon Dioxide 23 21 - 32 mmol/L     Glucose Level 91 70 - 115 mg/dL    Blood Urea Nitrogen 19.0 7.0 - 20.0 mg/dL    Creatinine 1.14 0.66 - 1.25 mg/dL    Calcium Level Total 9.2 8.4 - 10.2 mg/dL    Protein Total 8.3 (H) 6.3 - 8.2 gm/dL    Albumin Level 4.9 3.4 - 5.0 g/dL    Globulin 3.4 2.0 - 3.9 gm/dL    Albumin/Globulin Ratio 1.4 ratio    Bilirubin Total 1.2 (H) 0.0 - 1.0 mg/dL    Alkaline Phosphatase 77 50 - 144 unit/L    Alanine Aminotransferase 24 1 - 45 unit/L    Aspartate Aminotransferase 37 17 - 59 unit/L    eGFR 71 mls/min/1.73/m2    Anion Gap 11.0 2.0 - 13.0 mEq/L    BUN/Creatinine Ratio 17 12 - 20   Protime-INR    Collection Time: 06/14/23 12:07 PM   Result Value Ref Range    PT 9.9 9.3 - 11.9 seconds    INR 0.97 <=5.00   TSH    Collection Time: 06/14/23 12:07 PM   Result Value Ref Range    Thyroid Stimulating Hormone 4.380 (H) 0.360 - 3.740 uIU/mL   LDL - Lipid Panel    Collection Time: 06/14/23 12:07 PM   Result Value Ref Range    Cholesterol Total 219 (H) 0 - 200 mg/dL    HDL Cholesterol 86 (H) 40 - 60 mg/dL    Triglyceride 52 30 - 200 mg/dL    LDL Cholesterol Direct 100.6 (H) 30.0 - 100.0 mg/dL   Troponin I    Collection Time: 06/14/23 12:07 PM   Result Value Ref Range    Troponin-I <0.012 0.000 - 0.034 ng/mL   APTT    Collection Time: 06/14/23 12:07 PM   Result Value Ref Range    PTT 23.7 23.0 - 29.4 seconds   CBC with Differential    Collection Time: 06/14/23 12:07 PM   Result Value Ref Range    WBC 7.61 4.00 - 11.50 x10(3)/mcL    RBC 5.62 4.00 - 6.00 x10(6)/mcL    Hgb 17.8 13.0 - 18.0 g/dL    Hct 49.9 36.0 - 52.0 %    MCV 88.8 79.0 - 99.0 fL    MCH 31.7 27.0 - 34.0 pg    MCHC 35.7 31.0 - 37.0 g/dL    RDW 13.3 %    Platelet 257 140 - 371 x10(3)/mcL    MPV 10.3 9.4 - 12.4 fL    Neut % 83.6 (H) 37 - 73 %    Lymph % 9.5 (L) 20 - 55 %    Mono % 5.8 4.7 - 12.5 %    Eos % 0.3 (L) 0.7 - 7 %    Basophil % 0.5 0.1 - 1.2 %    Lymph # 0.72 (L) 1.32 - 3.57 x10(3)/mcL    Neut # 6.37 (H) 1.78 - 5.38 x10(3)/mcL    Mono # 0.44 0.3 - 0.82 x10(3)/mcL     Eos # 0.02 (L) 0.04 - 0.54 x10(3)/mcL    Baso # 0.04 0.01 - 0.08 x10(3)/mcL    IG# 0.02 0.0001 - 0.031 x10(3)/mcL    IG% 0.3 0 - 0.5 %    NRBC% 0.0 <=1 %   Urinalysis Clean Catch    Collection Time: 06/14/23  1:30 PM   Result Value Ref Range    Color, UA Yellow Yellow, Light-Yellow, Dark Yellow, Mitzy, Straw    Appearance, UA Clear Clear    Specific Gravity, UA >=1.030     pH, UA 6.0 5.0 - 8.5    Protein, UA Negative Negative mg/dL    Glucose, UA Negative Negative, Normal mg/dL    Ketones, UA 15 (A) Negative mg/dL    Blood, UA Trace-Intact (A) Negative unit/L    Bilirubin, UA Small (A) Negative mg/dL    Urobilinogen, UA 1.0 0.2, 1.0, Normal mg/dL    Nitrites, UA Negative Negative    Leukocyte Esterase, UA Negative Negative unit/L   Drug Screen, Urine    Collection Time: 06/14/23  1:30 PM   Result Value Ref Range    Amphetamines, Urine Negative Negative    Barbituates, Urine Negative Negative    Benzodiazepine, Urine Negative Negative    Cannabinoids, Urine Negative Negative    Cocaine, Urine Negative Negative    Opiates, Urine Negative Negative    Phencyclidine, Urine Negative Negative    pH, Urine 6.0 3.0 - 11.0    Methadone, Urine Negative Negative   HIV 1/2 Ag/Ab (4th Gen)    Collection Time: 06/14/23  4:56 PM   Result Value Ref Range    HIV Nonreactive Nonreactive   Vitamin B12    Collection Time: 06/14/23  4:56 PM   Result Value Ref Range    Vitamin B12 Level 334 213 - 816 pg/mL   Hemoglobin A1C    Collection Time: 06/15/23  1:04 AM   Result Value Ref Range    Hemoglobin A1c 4.7 <=7.0 %    Estimated Average Glucose 88.2 mg/dL   Basic Metabolic Panel    Collection Time: 06/15/23  1:04 AM   Result Value Ref Range    Sodium Level 135 (L) 136 - 145 mmol/L    Potassium Level 3.9 3.5 - 5.1 mmol/L    Chloride 101 98 - 107 mmol/L    Carbon Dioxide 23 23 - 31 mmol/L    Glucose Level 88 82 - 115 mg/dL    Blood Urea Nitrogen 12.8 8.4 - 25.7 mg/dL    Creatinine 0.93 0.73 - 1.18 mg/dL    BUN/Creatinine Ratio 14     Calcium  Level Total 8.6 (L) 8.8 - 10.0 mg/dL    Anion Gap 11.0 mEq/L    eGFR >60 mls/min/1.73/m2   CBC with Differential    Collection Time: 06/15/23  1:04 AM   Result Value Ref Range    WBC 7.62 4.50 - 11.50 x10(3)/mcL    RBC 5.04 4.70 - 6.10 x10(6)/mcL    Hgb 15.7 14.0 - 18.0 g/dL    Hct 44.9 42.0 - 52.0 %    MCV 89.1 80.0 - 94.0 fL    MCH 31.2 (H) 27.0 - 31.0 pg    MCHC 35.0 33.0 - 36.0 g/dL    RDW 13.5 11.5 - 17.0 %    Platelet 261 130 - 400 x10(3)/mcL    MPV 10.9 (H) 7.4 - 10.4 fL    Neut % 70.6 %    Lymph % 19.8 %    Mono % 7.7 %    Eos % 0.7 %    Basophil % 0.8 %    Lymph # 1.51 0.6 - 4.6 x10(3)/mcL    Neut # 5.38 2.1 - 9.2 x10(3)/mcL    Mono # 0.59 0.1 - 1.3 x10(3)/mcL    Eos # 0.05 0 - 0.9 x10(3)/mcL    Baso # 0.06 <=0.2 x10(3)/mcL    IG# 0.03 0 - 0.04 x10(3)/mcL    IG% 0.4 %    NRBC% 0.0 %   Echo Saline Bubble? Yes    Collection Time: 06/15/23  8:06 AM   Result Value Ref Range    BSA 1.55 m2    TDI SEPTAL 0.13 m/s    LV LATERAL E/E' RATIO 6.69 m/s    LV SEPTAL E/E' RATIO 8.23 m/s    EF 65 %    Left Ventricular Outflow Tract Mean Velocity 0.63 cm/s    Left Ventricular Outflow Tract Mean Gradient 2.00 mmHg    TDI LATERAL 0.16 m/s    PV PEAK VELOCITY 1.56 cm/s    LVIDd 3.35 (A) 3.5 - 6.0 cm    IVS 1.55 (A) 0.6 - 1.1 cm    Posterior Wall 1.00 0.6 - 1.1 cm    LVIDs 2.09 (A) 2.1 - 4.0 cm    FS 38 28 - 44 %    LV mass 140.20 g    LA size 3.30 cm    RVDD 1.57 cm    Left Ventricle Relative Wall Thickness 0.60 cm    AV regurgitation pressure 1/2 time 295 ms    AV mean gradient 3 mmHg    AV valve area 2.85 cm2    AV Velocity Ratio 0.85     AV index (prosthetic) 0.91     MV mean gradient 3 mmHg    MV valve area by continuity eq 3.44 cm2    E/A ratio 0.93     Mean e' 0.15 m/s    E wave deceleration time 106.00 msec    LVOT diameter 2.00 cm    LVOT area 3.1 cm2    LVOT peak vadim 0.97 m/s    LVOT peak VTI 13.60 cm    Ao peak vadim 1.14 m/s    Ao VTI 15.0 cm    LVOT stroke volume 42.70 cm3    AV peak gradient 5 mmHg    MV peak  gradient 6 mmHg    E/E' ratio 7.38 m/s    MV Peak E Gentry 1.07 m/s    AR Max Gentry 3.66 m/s    TR Max Gentry 1.04 m/s    MV VTI 12.4 cm    MV Peak A Gentry 1.15 m/s    LV Systolic Volume 14.20 mL    LV Systolic Volume Index 9.1 mL/m2    LV Diastolic Volume 45.80 mL    LV Diastolic Volume Index 29.36 mL/m2    LV Mass Index 90 g/m2    Triscuspid Valve Regurgitation Peak Gradient 4 mmHg    LA Volume Index (Mod) 21.8 mL/m2    LA volume (mod) 34.00 cm3    Tello's Biplane MOD Ejection Fraction 6 %   CV Ultrasound Bilateral Doppler Carotid    Collection Time: 06/15/23 10:08 PM   Result Value Ref Range    Left ICA/CCA ratio 0.66     Right ICA/CCA ratio 1.00     Left Highest ICA 57.00     Left Highest CCA 86     Right Highest ICA 48.00     Right Highest CCA 78     Right Highest EDV 17.00     LT Highest EDV 16.00     Right CCA prox sys 78 cm/s    Right CCA prox noel 11 cm/s    Right CCA dist sys 48 cm/s    Right CCA dist noel 8 cm/s    Right ICA prox sys 27 cm/s    Right ICA prox noel 7 cm/s    Right ICA mid sys 36 cm/s    Right ICA mid noel 10 cm/s    Right ICA dist sys 48 cm/s    Right ICA dist noel 17 cm/s    Right ECA sys 41 cm/s    Right vertebral sys 31 cm/s    Left CCA prox sys 64 cm/s    Left CCA prox noel 18 cm/s    Left CCA dist sys 86 cm/s    Left CCA dist noel 7 cm/s    Left ICA prox sys 47 cm/s    Left ICA prox noel 10 cm/s    Left ICA mid sys 57 cm/s    Left ICA mid noel 16 cm/s    Left ICA dist sys 50 cm/s    Left ICA dist noel 16 cm/s    Left ECA sys 62 cm/s    Left vertebral sys 35 cm/s    Right vertebral noel 6 cm/s    Right ECA noel 0 cm/s    Left vertebral noel 9 cm/s    Left ECA noel 0 cm/s     EKG:       Telemetry: ST    Physical Exam  Constitutional:       General: He is not in acute distress.     Appearance: Normal appearance. He is ill-appearing.   HENT:      Head: Normocephalic.      Mouth/Throat:      Mouth: Mucous membranes are moist.   Eyes:      Conjunctiva/sclera: Conjunctivae normal.   Cardiovascular:       Rate and Rhythm: Regular rhythm. Tachycardia present.      Pulses: Normal pulses.      Heart sounds: Normal heart sounds.   Pulmonary:      Effort: Pulmonary effort is normal. No respiratory distress.   Abdominal:      Palpations: Abdomen is soft.   Skin:     General: Skin is warm.   Neurological:      Mental Status: He is alert.      Comments: Aphasic, Emotional/Crying when Spoken To; R Sided Weakness     Home Medications:   No current facility-administered medications on file prior to encounter.     Current Outpatient Medications on File Prior to Encounter   Medication Sig Dispense Refill    amLODIPine (NORVASC) 10 MG tablet Take 10 mg by mouth nightly.      atorvastatin (LIPITOR) 80 MG tablet Take 80 mg by mouth once daily.      clopidogreL (PLAVIX) 75 mg tablet Take 75 mg by mouth once daily.      hydroCHLOROthiazide (HYDRODIURIL) 25 MG tablet Take 25 mg by mouth once daily.      metoprolol succinate (TOPROL-XL) 100 MG 24 hr tablet Take 100 mg by mouth once daily.       Current Inpatient Medications:    Current Facility-Administered Medications:     Amino acid 4.25% - dextrose 5% (CLINIMIX-E) solution (1L provides 42.5 gm AA, 50 gm CHO (170 kcal/L dextrose), Na 35, K 30, Mg 5, Ca 4.5, Acetate 70, Cl 39, Phos 15), , Intravenous, Continuous, Lilia Woods MD, Last Rate: 50 mL/hr at 06/16/23 1101, New Bag at 06/16/23 1101    amLODIPine tablet 10 mg, 10 mg, Oral, Daily, Lilia Woods MD, 10 mg at 06/16/23 0835    aspirin chewable tablet 81 mg, 81 mg, Oral, Daily, Estrada Ulloa MD, 81 mg at 06/16/23 0835    atorvastatin tablet 40 mg, 40 mg, Oral, Daily, Estrada Ulloa MD, 40 mg at 06/16/23 0835    cloNIDine tablet 0.1 mg, 0.1 mg, Oral, Q6H PRN, Lilia Woods MD    enoxaparin injection 40 mg, 40 mg, Subcutaneous, Q24H (prophylaxis, 1700), Nieves Marquez, NP, 40 mg at 06/15/23 1648    folic acid 1 mg in dextrose 5 % (D5W) 100 mL IVPB, 1 mg, Intravenous, Daily, Estrada Ulloa MD, Stopped at 06/16/23 0986     hydrALAZINE injection 10 mg, 10 mg, Intravenous, Q4H PRN, Lilia Woods MD    labetaloL injection 10 mg, 10 mg, Intravenous, Q4H PRN, Lilia Woods MD, 10 mg at 06/16/23 0610    labetaloL tablet 300 mg, 300 mg, Oral, Q12H, Lilia Woods MD    LORazepam (ATIVAN) injection 1 mg, 1 mg, Intravenous, Q2H PRN, Estrada Ulloa MD    multivitamin tablet, 1 tablet, Oral, Daily, Estrada Ulloa MD, 1 tablet at 06/16/23 0835    mupirocin 2 % ointment, , Nasal, BID, Gabriel Mathews MD, Given at 06/16/23 0902    sodium chloride 0.9% flush 10 mL, 10 mL, Intravenous, PRN, Estrada Ulloa MD    thiamine (B-1) 500 mg in dextrose 5 % (D5W) 100 mL IVPB, 500 mg, Intravenous, TID, Stopped at 06/16/23 0905 **FOLLOWED BY** [START ON 6/18/2023] thiamine (B-1) 250 mg in dextrose 5 % (D5W) 100 mL IVPB, 250 mg, Intravenous, Daily, Estrada Ulloa MD    VTE Risk Mitigation (From admission, onward)           Ordered     enoxaparin injection 40 mg  Every 24 hours         06/15/23 1159     Place sequential compression device  Until discontinued         06/14/23 1646     IP VTE LOW RISK PATIENT  Once         06/14/23 1646                  Assessment:   Acute CVA     - MRI (6.14.23) - L Internal Capsule Posterior Limb Acute Nonhemorrhagic Infarct; Several Old Infarcts, Severe Chronic Microangiographic Ischemia and Atrophy  Abnormal ECHO     - ECHO (6.15.23) - LVEF 65%; LV is Normal in Size with Concentric Remodeling and Normal Systolic Function; Study is Positive for Intercardiac Shunt  VIRI/Bilaterally Mild-Moderate     - CTA Head/Neck (6.14.23) - Calcified plaques bilateral proximal internal carotid arteries without hemodynamically significant stenosis. Right internal carotid artery cavernous portion 50-60% stenosis  HTN  HLD  ETOH Abuse  Tobacco Abuse  Medical Therapy Non-Compliance   No Hx of GIB    Plan:   Agree with Primary Team  Keep NPO after MN on Sunday for ZEYAD to Evaluate for Intercardiac Shunting  Risk, Benefits and Alternatives Reviewed and  Discussed with the PT and their Family (Son: Elie Lowry/Phone Consented) and they wish to proceed with above Procedure. (Consent on Chart)  Continue ASA and Statin  Will F/U on Monday for ZEYAD    Thank you for your consult.     Surjit Hernandez, ANP  Cardiology  [facility] - [facility] ICU  06/16/2023 1:41 PM       I have seen the patient, reviewed the Nurse Practitioner's note, assessment and plan. I have personally interviewed and examined the patient at bedside and agree with the findings. Medical decision making listed above were done under my guidance.    Physical exam:  Patient is  ill appearing  Cardiovascular system: regular rhythm, no murmur.  Lungs: CTAB.  Extremities: No leg edema.  Patient with aphasia and right-sided weakness    Plan:  Telemetry showed normal sinus rhythm, blood pressure 120/79  We will plan to do ZEYAD with bubble study on Monday (the patient ate this morning)  Keep NPO after midnight on Sunday

## 2023-06-16 NOTE — PLAN OF CARE
Problem: Occupational Therapy  Goal: Occupational Therapy Goal  Description: LTG: Pt will perform basic ADLs and ADL transfers with SBA using LRAD by discharge.    STG: to be met in 2 weeks    Pt will complete grooming standing at sink with LRAD with min a.  Pt will complete UB dressing with Min A.  Pt will complete LB dressing with Min A using LRAD.  Pt will complete toileting with min A using LRAD.  Pt will complete toilet transfer with min A using BSC and  LRAD.   Pt will demo increased strength in RUE to 3-/5 MMT for increased functional use during ADL tasks.    6/16/2023 1213 by Kindra Azevedo OT  Outcome: Ongoing, Progressing

## 2023-06-16 NOTE — PLAN OF CARE
Problem: Physical Therapy  Goal: Physical Therapy Goal  Description: Goals to be met by: 23     Patient will increase functional independence with mobility by performin. Supine to sit with MInimal Assistance  2. Sit to supine with MInimal Assistance  3. Sit to stand transfer with Minimal Assistance  4. Bed to chair transfer with Minimal Assistance using LRAD  5. Gait  x 150 feet with Minimal Assistance using LRAD.     Outcome: Ongoing, Progressing

## 2023-06-16 NOTE — PT/OT/SLP EVAL
Physical Therapy Evaluation    Patient Name:  Wallace Lowry   MRN:  [id number]    Recommendations:     Discharge Recommendations: rehabilitation facility   Discharge Equipment Recommendations: to be determined by next level of care   Barriers to discharge:  medical dx, impaired mobility, decreased independence     Assessment:     Wallace Lowry is a 67 y.o. male admitted with a medical diagnosis of acute ischemic L CVA, HTN emergency.  He presents with the following impairments/functional limitations: weakness, gait instability, impaired endurance, impaired balance, decreased lower extremity function, impaired self care skills, impaired functional mobility, decreased upper extremity function, impaired coordination.  Patient tolerated PT eval fairly well. Patient with some difficulty speaking and would get upset when he could not say what he was thinking. Patient required modA for sit<>stand; unable to appropriately take steps to attempt gait trial. Will continue to progress as able. Would benefit from rehab placement beyond this stay to maximize functional independence.     Rehab Prognosis: Good; patient would benefit from acute skilled PT services to address these deficits and reach maximum level of function.    Recent Surgery: * No surgery found *      Plan:     During this hospitalization, patient to be seen 6 x/week to address the identified rehab impairments via gait training, therapeutic activities, therapeutic exercises, neuromuscular re-education and progress toward the following goals:    Plan of Care Expires:  07/16/23    Subjective     Chief Complaint: n/a  Patient/Family Comments/goals: to return PLOF  Pain/Comfort:  Pain Rating 1: 0/10    Patients cultural, spiritual, [employer] conflicts given the current situation: no    Living Environment:  Pt lives with son in a raised SLH; 10 steps to enter/exit with a railing on the L going up. Son is not home during the day as he works.   Prior to admission,  patients level of function was independent.  Equipment used at home: none.  DME owned (not currently used): none.    Upon discharge, patient will have assistance from unsure; would benefit from placement.    Objective:     Communicated with NSG prior to session.  Patient found up in chair with blood pressure cuff, pulse ox (continuous), peripheral IV, telemetry  upon PT entry to room.    General Precautions: Standard, fall (BP<140/90)  Orthopedic Precautions:N/A   Braces: N/A  Respiratory Status: Room air  Blood Pressure: 124/92      Exams:  Cognitive Exam:  Patient is oriented to Person, Place, and Time  Sensation: intact to B LE  RLE Strength: grossly 4/5  LLE Strength: grossly 5/5  Skin integrity: Visible skin intact      Functional Mobility:  Transfers:     Sit to Stand:  moderate assistance with no AD; pt used L UE to push up from chair and then once in standing L HHA; x2 trials performed  R knee blocked   Pt required TC/VC for full hip extension  Pt had great difficulty clearing either LE from the floor; able to weight shift with Venus and cues, but was unable to successfully take a step    Patient provided with verbal education regarding POC, mobility, safety.  Understanding was verbalized.     Patient left up in chair with all lines intact, call button in reach, and RN notified.    GOALS:   Multidisciplinary Problems       Physical Therapy Goals          Problem: Physical Therapy    Goal Priority Disciplines Outcome Goal Variances Interventions   Physical Therapy Goal     PT, PT/OT Ongoing, Progressing     Description: Goals to be met by: 23     Patient will increase functional independence with mobility by performin. Supine to sit with MInimal Assistance  2. Sit to supine with MInimal Assistance  3. Sit to stand transfer with Minimal Assistance  4. Bed to chair transfer with Minimal Assistance using LRAD  5. Gait  x 150 feet with Minimal Assistance using LRAD.                          History:      Past Medical History:   Diagnosis Date    Hypertension     Stroke        No past surgical history on file.    Time Tracking:     PT Received On: 06/16/23  PT Start Time: 1059     PT Stop Time: 1114  PT Total Time (min): 15 min     Billable Minutes: Evaluation mod  TherAct 1    Addendum:  Additional TA charge 2/2 RN calling PT later in the day requesting for assistance to get pt b2b. At this time pt was soiled. Performed sit<>stand with modA- RN was able to perform terry care with pt in the standing position. Pt was then stand pivoted from chair to bed with maxA.       06/16/2023

## 2023-06-16 NOTE — PLAN OF CARE
Problem: Occupational Therapy  Goal: Occupational Therapy Goal  Description: LTG: Pt will perform basic ADLs and ADL transfers with SBA using LRAD by discharge.    STG: to be met in 2 weeks    Pt will complete grooming standing at sink with LRAD with min a.  Pt will complete UB dressing with Min A.  Pt will complete LB dressing with Min A using LRAD.  Pt will complete toileting with min A using LRAD.  Pt will complete toilet transfer with min A using BSC and  LRAD.   Outcome: Ongoing, Progressing

## 2023-06-16 NOTE — PT/OT/SLP EVAL
"Speech Language Pathology Department  Cognitive-Communication Evaluation    Patient Name:  Wallace Lowry   MRN:  [id number]    Recommendations:     General recommendations:  dysphagia therapy and speech/language therapy  Communication strategies:  provide increased time to answer and go to room if call light pushed    Discharge recommendations:   pending progress  Barriers to safe discharge: acuity of illness    History:     Wallace Lowry is a/n 67 y.o. male admitted on 6/14 with hypertension, right facial droop, slurred speech, and right upper and lower extremity weakness.    Past Medical History:   Diagnosis Date    Hypertension     Stroke      No past surgical history on file.    Previous level of Function  Education:high school  Occupation: retired  Lives: with children  Handed: Right  Glasses: no  Hearing Aids: no    Subjective     Patient awake, alert, cooperative, and labile.    Patient goals: "It's gonna get better"     Spiritual/Cultural/Bahai Beliefs/Practices that affect care: no  Pain/Comfort:  0/10  Respiratory Status: room air    Objective:     SPEECH PRODUCTION  Phoneme Production: imprecise consonant production  Voice Quality: strained/strangled  Voice Production: adequate  Speech Rate: variable  Loudness: reduced  Respiration: WFL for speech  Resonance: adequate  Prosody: adequate  Speech Intelligibility  Known Context: 50%-75%  Unknown Context: 25%-50%    AUDITORY COMPREHENSION  Following Directions:  1-Step: 100%  2-Step: 100%  Yes/No Questions:  Biographical: 100%  Environmental: 100%  Simple: 100%  Complex: 90%    VERBAL EXPRESSION  Automatic Speech:  Days of the week: WFL  Months of the year: WFL  Counting: WFL  Alphabet: WFL  Phrase Completion: 80%  Confrontation Naming  Body Parts: 100%  Objects: 100%  Wh- Questions:  Object name: 100%  Object function: 100%    COGNITION  Orientation:  Person: yes  Place: yes  Time: inconsistent; knows year but unable to state month/date  Situation: " yes   Attention:  Focused: WFL  Sustained: WFL  Memory:  Immediate: WFL  Short Term: Impaired; 4/12 on four word memory task  Long Term: WFL  Problem Solving  Functional simple: WFL  Organization:  Convergent thinking: WFL  Divergent thinking: WFL; word finding difficulties noted    Assessment:     Pt presents with speech, language, and cognition impairments impacting communicative effectiveness as well as safe, independent living. SLP intervention is warranted at this time.     Goals:     Multidisciplinary Problems       SLP Goals          Problem: SLP    Goal Priority Disciplines Outcome   SLP Goal     SLP Ongoing, Progressing   Description:   LTG: Tolerate least restrictive PO diet with no clinical signs/sx aspiration  STGs:  1.  Pt will tolerate 2oz of ice chips by spoon with no clinical signs/sx aspiration.   2.  Complete lingual, labial, base of tongue, and laryngeal strengthening exercises with minimal cues  3.  Tolerate thermal stimulation to the anterior faucial pillars with 100% effortful swallow responses and delay less than 2 seconds.      LTG: Pt will improve communicative effectiveness to have needs/wants met with less than 10% communicative breakdown.  STGs:  1. Pt will increase speech intelligibility to 90% at the phrase level with minimal cues.  2. Pt will increase speech intelligibility to 80% at the sentence level with minimal cues.  3. Pt will increase speech intelligibility to 80% at the conversation level with minimal cues.  4. Pt will use word finding strategies in conversation with minimal cues.      LTG: Pt will increase cognitive linguistic skills to increase safety awareness and independence  STGs:  1. Pt will recall 3-4 items with 80% accuracy following 5 minute filled delay with minimal cues.                     Patient Education:     Patient provided with verbal education regarding SLP POC.  Understanding was verbalized, however additional teaching warranted.    Plan:     SLP Follow-Up:   Yes   Patient to be seen:  5 x/week   Plan of Care expires:  06/29/23  Plan of Care reviewed with:  patient      Time Tracking:     SLP Treatment Date:   06/16/23  Speech Start Time:  0917  Speech Stop Time:  0944     Speech Total Time (min):  27 min    Billable minutes:  Evaluation of Speech Sound Production with Comprehension and Expression, 27 minutes     06/16/2023

## 2023-06-16 NOTE — PLAN OF CARE
SW met with patient and discussed rehab consult. Patient agreed and selected [facility] Phone: [phone]. SW sent referral via [employer].

## 2023-06-16 NOTE — NURSING
Nurses Note -- 4 Eyes      6/16/2023   9:54 AM      Skin assessed during: Q Shift Change      [x] No Altered Skin Integrity Present    [x]Prevention Measures Documented      [] Yes- Altered Skin Integrity Present or Discovered   [] LDA Added if Not in Epic (Describe Wound)   [] New Altered Skin Integrity was Present on Admit and Documented in LDA   [] Wound Image Taken    Wound Care Consulted? No    Attending Nurse:  Linda Tellez RN     Second RN/Staff Member:  Janina in reach program

## 2023-06-16 NOTE — PT/OT/SLP EVAL
Occupational Therapy  Evaluation    Name: Wallace Lowry  MRN: [id number]  Admitting Diagnosis: CVA  Recent Surgery: * No surgery found *      Recommendations:     Discharge Recommendations: rehabilitation facility  Discharge Equipment Recommendations:  to be determined by next level of care  Barriers to discharge:  None    Assessment:     Wallace Lowry is a 67 y.o. male with a medical diagnosis of acute ischemic L CVA, HTN emergency.  He presents with the following performance deficits affecting function: weakness, impaired endurance, impaired self care skills, impaired functional mobility, impaired balance, decreased lower extremity function, decreased upper extremity function, decreased coordination, decreased ROM. PT noted to have aphasia and R sided weakness. Pt is an excellent candidate for IP rehab placement for aggressive multidisciplinary therapies to maximize functional recovery.    Rehab Prognosis: Good; patient would benefit from acute skilled OT services to address these deficits and reach maximum level of function.       Plan:     Patient to be seen 6 x/week to address the above listed problems via self-care/home management, therapeutic activities, therapeutic exercises, neuromuscular re-education, cognitive retraining  Plan of Care Expires: 06/30/23  Plan of Care Reviewed with: patient    Subjective     Chief Complaint: difficulty speaking  Patient/Family Comments/goals: to get better    Occupational Profile:  Living Environment: pt lives with son in a raised mobile home with ~10 steps to enter and R rail, tub/shower combo  Previous level of function: independent  Roles and Routines: driving, ADLs, IADLs, not working  Equipment Used at Home: none  Assistance upon Discharge: son when not at work  *pt reports that he would like to go to rehab in [place] if possible on dc    Pain/Comfort:  Pain Rating 1: 0/10    Patients cultural, spiritual, [employer] conflicts given the current situation:  no    Objective:     Communicated with: RN prior to session.  Patient found right sidelying with pulse ox (continuous), telemetry, blood pressure cuff, Condom Catheter upon OT entry to room.    General Precautions: Standard, fall, aphasia (dysphagia diet, BP goal <140/90)  Orthopedic Precautions: N/A  Braces: N/A    Vital Signs  Blood Pressure: 137/87  HR: 86  Sp02: 94%  Respiratory Status: on room air    Occupational Performance:    Bed Mobility:    Patient completed Supine to Sit with moderate assistance    Functional Mobility/Transfers:  Sit<>Stand Transition: moderate assistance   Bed<>Chair Transfer: maximal assistance   *R knee buckling, assistance needed to advance RLE    Activities of Daily Living:  Upper Body Dressing: maximal assistance   Lower Body Dressing: maximal assistance      AMPAC 6 Click ADL Scale:  Total Score: 11    Functional Cognition  Attention: WFL  Initiation: WFL  Simple Command following: WFL  Communication: Impaired.      Visual Perceptual Skills:  Visual Attention: WFL  Pursuits: WFL  Visual Fields: WFL    Physical Exam:  Sitting Balance: Min A, R lean    Standing Balance: mod A, R knee blocking    UE Function:  RUE:   Range of Motion: PROM intact, AROM severely limited at all joints  Strength: Impaired. 2-/5 at all joints  Coordination: Impaired.      LUE:   Range of Motion: WFL  Strength: WFL  Coordination: WFL    Therapeutic Positioning  Risk for acquired pressure injuries is increased due to impaired mobility and inability to communicate toileting needs.    OT interventions performed during the course of today's session in an effort to prevent and/or reduce acquired pressure injuries: -skin assessment was performed  -education was provided on pressure ulcer prevention   -collaboration with nursing staff on therapeutic positioning recommendations     Skin assessment: full body skin assessment was performed    Findings: no redness or breakdown noted    OT recommendations for  therapeutic positioning throughout hospitalization: standard pressure injury prevention measures    Patient Education:  Patient provided with verbal education regarding OT role/goals/POC.  Understanding was verbalized, however additional teaching warranted.     Patient left up in chair with all lines intact and call button in reach    GOALS:   Multidisciplinary Problems       Occupational Therapy Goals          Problem: Occupational Therapy    Goal Priority Disciplines Outcome Interventions   Occupational Therapy Goal     OT, PT/OT Ongoing, Progressing    Description: LTG: Pt will perform basic ADLs and ADL transfers with SBA using LRAD by discharge.    STG: to be met in 2 weeks    Pt will complete grooming standing at sink with LRAD with min a.  Pt will complete UB dressing with Min A.  Pt will complete LB dressing with Min A using LRAD.  Pt will complete toileting with min A using LRAD.  Pt will complete toilet transfer with min A using BSC and  LRAD.   Pt will demo increased strength in RUE to 3-/5 MMT for increased functional use during ADL tasks.                       History:     Past Medical History:   Diagnosis Date    Hypertension     Stroke        No past surgical history on file.    Time Tracking:     OT Date of Treatment: 06/16/23  OT Start Time: 1020  OT Stop Time: 1052  OT Total Time (min): 32 min    Billable Minutes:Evaluation high    6/16/2023

## 2023-06-16 NOTE — NURSING
Nurses Note -- 4 Eyes      6/16/2023   6:28 AM      Skin assessed during: Daily Assessment      [x] No Altered Skin Integrity Present    [x]Prevention Measures Documented      [] Yes- Altered Skin Integrity Present or Discovered   [] LDA Added if Not in Epic (Describe Wound)   [] New Altered Skin Integrity was Present on Admit and Documented in LDA   [] Wound Image Taken    Wound Care Consulted? No    Attending Nurse:  Cleopatra Alaniz RN     Second RN/Staff Member:  Fahad Starks RN

## 2023-06-17 LAB — T PALLIDUM AB SER QL: NON REACTIVE

## 2023-06-17 PROCEDURE — 94761 N-INVAS EAR/PLS OXIMETRY MLT: CPT

## 2023-06-17 PROCEDURE — 97530 THERAPEUTIC ACTIVITIES: CPT | Mod: CQ

## 2023-06-17 PROCEDURE — 21400001 HC TELEMETRY ROOM

## 2023-06-17 PROCEDURE — 25000003 PHARM REV CODE 250: Performed by: STUDENT IN AN ORGANIZED HEALTH CARE EDUCATION/TRAINING PROGRAM

## 2023-06-17 PROCEDURE — 63600175 PHARM REV CODE 636 W HCPCS: Performed by: NURSE PRACTITIONER

## 2023-06-17 PROCEDURE — 97535 SELF CARE MNGMENT TRAINING: CPT | Mod: CO

## 2023-06-17 PROCEDURE — 25000003 PHARM REV CODE 250: Performed by: INTERNAL MEDICINE

## 2023-06-17 RX ORDER — LABETALOL 100 MG/1
100 TABLET, FILM COATED ORAL EVERY 12 HOURS
Status: DISCONTINUED | OUTPATIENT
Start: 2023-06-17 | End: 2023-06-18

## 2023-06-17 RX ADMIN — LABETALOL HYDROCHLORIDE 300 MG: 100 TABLET, FILM COATED ORAL at 08:06

## 2023-06-17 RX ADMIN — THIAMINE HCL TAB 100 MG 100 MG: 100 TAB at 09:06

## 2023-06-17 RX ADMIN — LABETALOL HYDROCHLORIDE 100 MG: 100 TABLET, FILM COATED ORAL at 08:06

## 2023-06-17 RX ADMIN — MUPIROCIN: 20 OINTMENT TOPICAL at 08:06

## 2023-06-17 RX ADMIN — FOLIC ACID 1 MG: 1 TABLET ORAL at 08:06

## 2023-06-17 RX ADMIN — ATORVASTATIN CALCIUM 40 MG: 40 TABLET, FILM COATED ORAL at 08:06

## 2023-06-17 RX ADMIN — ASPIRIN 81 MG CHEWABLE TABLET 81 MG: 81 TABLET CHEWABLE at 08:06

## 2023-06-17 RX ADMIN — THERA TABS 1 TABLET: TAB at 08:06

## 2023-06-17 RX ADMIN — AMLODIPINE BESYLATE 10 MG: 5 TABLET ORAL at 08:06

## 2023-06-17 RX ADMIN — ENOXAPARIN SODIUM 40 MG: 40 INJECTION SUBCUTANEOUS at 05:06

## 2023-06-17 NOTE — NURSING
Nurses Note -- 4 Eyes      6/17/2023   6:26 AM      Skin assessed during: Daily Assessment      [x] No Altered Skin Integrity Present    [x]Prevention Measures Documented      [] Yes- Altered Skin Integrity Present or Discovered   [] LDA Added if Not in Epic (Describe Wound)   [] New Altered Skin Integrity was Present on Admit and Documented in LDA   [] Wound Image Taken    Wound Care Consulted? No    Attending Nurse:  Cleopatra Alaniz RN     Second RN/Staff Member:  Fahad Starks RN

## 2023-06-17 NOTE — PT/OT/SLP PROGRESS
Occupational Therapy   Treatment    Name: Wallace Lowry  MRN: [id number]  Admitting Diagnosis: acute Ischemic L CVA      Recommendations:     Discharge Recommendations: rehabilitation facility  Discharge Equipment Recommendations:   (TBD by next level of care.)  Barriers to discharge:   (severity of deficits)    Assessment:     Wallace Lowry is a 67 y.o. male with a medical diagnosis of acute ischemic L CVA. Performance deficits affecting function are weakness, impaired endurance, impaired self care skills, impaired functional mobility, gait instability, impaired balance, decreased coordination, decreased upper extremity function, decreased lower extremity function, impaired coordination, impaired fine motor.     Rehab Prognosis:  Good; patient would benefit from acute skilled OT services to address these deficits and reach maximum level of function.       Plan:     Patient to be seen 6 x/week to address the above listed problems via self-care/home management, therapeutic activities, therapeutic exercises, neuromuscular re-education, cognitive retraining  Plan of Care Expires: 06/30/23  Plan of Care Reviewed with: patient    Subjective     Chief Complaint: None  Patient/Family Comments/goals:  Pain/Comfort:  Pain Rating 1: 0/10    Objective:     Communicated with: RN prior to session.  Patient found up in chair with blood pressure cuff, zapata catheter, peripheral IV, pulse ox (continuous), telemetry upon OT entry to room.    General Precautions: Standard, aphasia    Orthopedic Precautions:N/A  Braces: N/A  Respiratory Status: Room air     Occupational Performance:     Bed Mobility:    Patient completed Sit to Supine with max A.    Functional Mobility/Transfers:  Patient completed Sit <> Stand Transfer with moderate assistance   Patient completed Bed <> Chair Transfer with Max A.  Functional Mobility: Max A    Activities of Daily Living:  Feeding: SBA and set up before and after meals.   [facility] 6 Click ADL:       Treatment & Education:  Educated Pt on safety safety with ADL's and roles/goals of OT.    Patient left HOB elevated with all lines intact, call button in reach, and nurse notified    GOALS:   Multidisciplinary Problems       Occupational Therapy Goals          Problem: Occupational Therapy    Goal Priority Disciplines Outcome Interventions   Occupational Therapy Goal     OT, PT/OT Ongoing, Progressing    Description: LTG: Pt will perform basic ADLs and ADL transfers with SBA using LRAD by discharge.    STG: to be met in 2 weeks    Pt will complete grooming standing at sink with LRAD with min a.  Pt will complete UB dressing with Min A.  Pt will complete LB dressing with Min A using LRAD.  Pt will complete toileting with min A using LRAD.  Pt will complete toilet transfer with min A using BSC and  LRAD.   Pt will demo increased strength in RUE to 3-/5 MMT for increased functional use during ADL tasks.                       Time Tracking:     OT Date of Treatment: 06/17/23  OT Start Time: 1242  OT Stop Time: 1255  OT Total Time (min): 13 min    Billable Minutes:Self Care/Home Management 13    OT/SCOTTIE: SCOTTIE     Number of SCOTTIE visits since last OT visit: 1    6/17/2023

## 2023-06-17 NOTE — PT/OT/SLP PROGRESS
Physical Therapy Treatment    Patient Name:  Wallace Lowry   MRN:  [id number]    Recommendations:     Discharge Recommendations: rehabilitation facility  Discharge Equipment Recommendations: to be determined by next level of care  Barriers to discharge:     Assessment:     Wallace Lowry is a 67 y.o. male admitted with a medical diagnosis of L acute ischemic CVA.  He presents with the following impairments/functional limitations: weakness, gait instability, impaired endurance, impaired balance, impaired functional mobility, impaired self care skills, decreased lower extremity function, decreased safety awareness, decreased coordination.    Rehab Prognosis: Good; patient would benefit from acute skilled PT services to address these deficits and reach maximum level of function.    Recent Surgery: * No surgery found *      Plan:     During this hospitalization, patient to be seen 6 x/week to address the identified rehab impairments via gait training, therapeutic activities, therapeutic exercises, neuromuscular re-education and progress toward the following goals:    Plan of Care Expires:  07/16/23    Subjective     Chief Complaint: Pt became emotional at times during session when attempting to talk.  Patient/Family Comments/goals:   Pain/Comfort:         Objective:     Communicated with NSG prior to session.  Patient found HOB elevated with pulse ox (continuous), telemetry, blood pressure cuff, Condom Catheter upon PTA entry to room.     General Precautions: Standard, fall, aphasia, /90  Orthopedic Precautions: N/A  Braces: N/A  Respiratory Status: Room air 94%  Blood Pressure: 97/63 HR 83  Skin Integrity: Visible skin intact    Functional Mobility:  Bed: Marie supine <-> sit EOB   T/F with HHA: ModA sit <-> stand with HHA  Static stand with HHA for support and R knee blocked: ModA for balance. Tcs to hip to increase hip extension and cues for upright posture, pt corrected.   Gait with johnson railing: MaxA, Pt  required assistance with blocking R knee as pt ambulated for 6 ft. Vcs  and assistance for sequencing.       Education:  Patient provided with verbal education regarding safety.  Understanding was verbalized, however additional teaching warranted.     Patient left up in chair with all lines intact, call button in reach, and NSG notified..    GOALS:   Multidisciplinary Problems       Physical Therapy Goals          Problem: Physical Therapy    Goal Priority Disciplines Outcome Goal Variances Interventions   Physical Therapy Goal     PT, PT/OT Ongoing, Progressing     Description: Goals to be met by: 23     Patient will increase functional independence with mobility by performin. Supine to sit with MInimal Assistance  2. Sit to supine with MInimal Assistance  3. Sit to stand transfer with Minimal Assistance  4. Bed to chair transfer with Minimal Assistance using LRAD  5. Gait  x 150 feet with Minimal Assistance using LRAD.                          Time Tracking:     PT Received On:    PT Start Time: 930     PT Stop Time: 953  PT Total Time (min): 23 min     Billable Minutes: Therapeutic Activity 23    Treatment Type: Treatment  PT/PTA: PTA     Number of PTA visits since last PT visit: 0     2023

## 2023-06-17 NOTE — PROGRESS NOTES
[facility]  Hospital Medicine Progress Note        Chief Complaint: Inpatient Follow-up    HPI:   67-year-old male with significant history of HTN, HLD, prior CVA with unknown residual deficit, carotid artery stenosis status post right-sided carotid stent, former smoker quit 9 months back, alcohol abuse presented to the hospital as a transfer from outlying facility for acute CVA.  Patient went to bed normal on 06/12 and woke up on 06/13 with acute right-sided weakness, facial droop, slurry speech.  Patient was admitted to our ICU on 06/14.  Patient was severely hypertensive with systolic in 200s.  CT head was negative.  Initiated Cardene drip.  Neurology services consulted.  Stroke workup ordered.  MRI brain confirmed left internal capsule posterior limb acute infarct.  CT angiogram with no hemodynamically significant stenosis in internal carotid, right ICA 50-60% stenosis.  Echocardiogram pending paid neurology recommended aspirin, statin.  Weaned off Cardene drip and downgraded to hospitalist medicine service on 06/15.  Neurology recommending low-dose aspirin, statin.  Will need tighter control of blood pressure. initiated scheduled amlodipine, labetalol.  CT angiogram showed 50-60% stenosis on right ICA, patient has had previous stenting in the past.  Echocardiogram pending.  Therapy Services following. cleared for modified diet.  Still having swallowing difficulty as of 6/16 and therefore initiated Clinimix.  Labetalol increased to 300 mg b.i.d. for better control of blood pressure.  Bubble study positive and therefore cardiology consulted for ZEYAD.  Plan for 6/19.    Interval Hx:   Patient seen at bedside.  Patient became significantly hypotensive today morning with systolic dropping as low as 70s.  Asymptomatic.  I saw him at bedside.  Comfortably sitting in the couch.  He is in better spirits.  Tolerating diet well., was able to finish most of his breakfast.    Objective/physical  exam:  General: In no acute distress, afebrile  Chest: Clear to auscultation bilaterally  Heart: S1, S2, no appreciable murmur  Abdomen: Soft, nontender, BS +  MSK: Warm, no lower extremity edema, no clubbing or cyanosis  Neurologic: alert, awake, oriented.  Severe expressive aphasia, dysarthria, right-sided weakness  VITAL SIGNS: 24 HRS MIN & MAX LAST   Temp  Min: 97.5 °F (36.4 °C)  Max: 98.2 °F (36.8 °C) 97.6 °F (36.4 °C)   BP  Min: 87/60  Max: 139/88 107/87   Pulse  Min: 70  Max: 88  88   Resp  Min: 15  Max: 23 (!) 23   SpO2  Min: 90 %  Max: 98 % 95 %       Recent Labs   Lab 06/15/23  0104   WBC 7.62   RBC 5.04   HGB 15.7   HCT 44.9   MCV 89.1   MCH 31.2*   MCHC 35.0   RDW 13.5      MPV 10.9*         Recent Labs   Lab 06/14/23  1207 06/15/23  0104    135*   K 4.3 3.9   CO2 23 23   BUN 19.0 12.8   CREATININE 1.14 0.93   CALCIUM 9.2 8.6*   ALBUMIN 4.9  --    ALKPHOS 77  --    ALT 24  --    AST 37  --    BILITOT 1.2*  --           Microbiology Results (last 7 days)       ** No results found for the last 168 hours. **             Scheduled Med:   amLODIPine  10 mg Oral Daily    aspirin  81 mg Oral Daily    atorvastatin  40 mg Oral Daily    enoxparin  40 mg Subcutaneous Q24H (prophylaxis, 1700)    folic acid  1 mg Oral Daily    labetaloL  300 mg Oral Q12H    multivitamin  1 tablet Oral Daily    mupirocin   Nasal BID    thiamine  100 mg Oral Daily          Assessment/Plan:    Acute ischemic CVA-left internal capsule posterior limb infarct   Right-sided weakness/dysarthria/expressive aphasia/facial droop secondary to above  Poorly-controlled HTN admitted with hypertensive emergency-improved  Hypotension on 06/17  Positive bubble study  HLD   History of carotid artery stenosis status post right-sided carotid stent placement   Former tobacco use  Alcohol abuse  Medical noncompliance  Prophylaxis    Patient is on low-dose aspirin, statin as recommended by neurology   Ultrasound carotid with less than 50%  stenosis bilaterally   Bubble study positive   Cardiology consulted and they are planning for ZEYAD on 06/19  NPO after midnight on Sunday   On labetalol, amlodipine for hypertension   Significantly hypotensive today morning   Blood pressure improved with IV fluid bolus   I will decrease labetalol to 100 mg b.i.d.  Hold labetalol and amlodipine if blood pressure less than 100/60  Continue aggressive PT/OT/ST  Better tolerating diet today, DC Clinimix  Continue multivitamin, thiamine, folic acid  DVT prophylaxis-Lovenox     Case management consulted for inpatient rehab placement  Await ZEYAD on Monday    Critical care time-35 minutes  Critical care diagnosis-hypotension requiring IV fluid bolus  Critical care interventions: Hands-on evaluation, review of labs/radiographs/records and discussions with patient          Lilia Woods MD   06/17/2023

## 2023-06-18 LAB
ALBUMIN SERPL-MCNC: 3.1 G/DL (ref 3.4–4.8)
ALBUMIN/GLOB SERPL: 1.2 RATIO (ref 1.1–2)
ALP SERPL-CCNC: 64 UNIT/L (ref 40–150)
ALT SERPL-CCNC: 20 UNIT/L (ref 0–55)
AST SERPL-CCNC: 32 UNIT/L (ref 5–34)
BASOPHILS # BLD AUTO: 0.05 X10(3)/MCL
BASOPHILS NFR BLD AUTO: 0.7 %
BILIRUBIN DIRECT+TOT PNL SERPL-MCNC: 0.7 MG/DL
BUN SERPL-MCNC: 14.9 MG/DL (ref 8.4–25.7)
CALCIUM SERPL-MCNC: 8.3 MG/DL (ref 8.8–10)
CHLORIDE SERPL-SCNC: 110 MMOL/L (ref 98–107)
CO2 SERPL-SCNC: 19 MMOL/L (ref 23–31)
CREAT SERPL-MCNC: 0.87 MG/DL (ref 0.73–1.18)
EOSINOPHIL # BLD AUTO: 0.12 X10(3)/MCL (ref 0–0.9)
EOSINOPHIL NFR BLD AUTO: 1.6 %
ERYTHROCYTE [DISTWIDTH] IN BLOOD BY AUTOMATED COUNT: 13.5 % (ref 11.5–17)
GFR SERPLBLD CREATININE-BSD FMLA CKD-EPI: >60 MLS/MIN/1.73/M2
GLOBULIN SER-MCNC: 2.5 GM/DL (ref 2.4–3.5)
GLUCOSE SERPL-MCNC: 86 MG/DL (ref 82–115)
HCT VFR BLD AUTO: 39.7 % (ref 42–52)
HGB BLD-MCNC: 13.6 G/DL (ref 14–18)
IMM GRANULOCYTES # BLD AUTO: 0.03 X10(3)/MCL (ref 0–0.04)
IMM GRANULOCYTES NFR BLD AUTO: 0.4 %
LYMPHOCYTES # BLD AUTO: 1.07 X10(3)/MCL (ref 0.6–4.6)
LYMPHOCYTES NFR BLD AUTO: 14.3 %
MCH RBC QN AUTO: 31.1 PG (ref 27–31)
MCHC RBC AUTO-ENTMCNC: 34.3 G/DL (ref 33–36)
MCV RBC AUTO: 90.6 FL (ref 80–94)
MONOCYTES # BLD AUTO: 0.61 X10(3)/MCL (ref 0.1–1.3)
MONOCYTES NFR BLD AUTO: 8.2 %
NEUTROPHILS # BLD AUTO: 5.58 X10(3)/MCL (ref 2.1–9.2)
NEUTROPHILS NFR BLD AUTO: 74.8 %
NRBC BLD AUTO-RTO: 0 %
PLATELET # BLD AUTO: 196 X10(3)/MCL (ref 130–400)
PMV BLD AUTO: 11.1 FL (ref 7.4–10.4)
POTASSIUM SERPL-SCNC: 3.9 MMOL/L (ref 3.5–5.1)
PROT SERPL-MCNC: 5.6 GM/DL (ref 5.8–7.6)
RBC # BLD AUTO: 4.38 X10(6)/MCL (ref 4.7–6.1)
SODIUM SERPL-SCNC: 139 MMOL/L (ref 136–145)
WBC # SPEC AUTO: 7.46 X10(3)/MCL (ref 4.5–11.5)

## 2023-06-18 PROCEDURE — 25000003 PHARM REV CODE 250: Performed by: STUDENT IN AN ORGANIZED HEALTH CARE EDUCATION/TRAINING PROGRAM

## 2023-06-18 PROCEDURE — 25000003 PHARM REV CODE 250: Performed by: INTERNAL MEDICINE

## 2023-06-18 PROCEDURE — 85025 COMPLETE CBC W/AUTO DIFF WBC: CPT | Performed by: INTERNAL MEDICINE

## 2023-06-18 PROCEDURE — 21400001 HC TELEMETRY ROOM

## 2023-06-18 PROCEDURE — 80053 COMPREHEN METABOLIC PANEL: CPT | Performed by: INTERNAL MEDICINE

## 2023-06-18 PROCEDURE — 63600175 PHARM REV CODE 636 W HCPCS: Performed by: NURSE PRACTITIONER

## 2023-06-18 PROCEDURE — 94761 N-INVAS EAR/PLS OXIMETRY MLT: CPT

## 2023-06-18 RX ORDER — LABETALOL 200 MG/1
200 TABLET, FILM COATED ORAL EVERY 12 HOURS
Status: DISCONTINUED | OUTPATIENT
Start: 2023-06-18 | End: 2023-06-22 | Stop reason: HOSPADM

## 2023-06-18 RX ORDER — SODIUM BICARBONATE 650 MG/1
650 TABLET ORAL 2 TIMES DAILY
Status: COMPLETED | OUTPATIENT
Start: 2023-06-18 | End: 2023-06-19

## 2023-06-18 RX ADMIN — LABETALOL HYDROCHLORIDE 100 MG: 100 TABLET, FILM COATED ORAL at 08:06

## 2023-06-18 RX ADMIN — SODIUM BICARBONATE 650 MG TABLET 650 MG: at 08:06

## 2023-06-18 RX ADMIN — FOLIC ACID 1 MG: 1 TABLET ORAL at 08:06

## 2023-06-18 RX ADMIN — MUPIROCIN: 20 OINTMENT TOPICAL at 08:06

## 2023-06-18 RX ADMIN — THERA TABS 1 TABLET: TAB at 08:06

## 2023-06-18 RX ADMIN — ENOXAPARIN SODIUM 40 MG: 40 INJECTION SUBCUTANEOUS at 04:06

## 2023-06-18 RX ADMIN — THIAMINE HCL TAB 100 MG 100 MG: 100 TAB at 08:06

## 2023-06-18 RX ADMIN — ASPIRIN 81 MG CHEWABLE TABLET 81 MG: 81 TABLET CHEWABLE at 08:06

## 2023-06-18 RX ADMIN — SODIUM BICARBONATE 650 MG TABLET 650 MG: at 11:06

## 2023-06-18 RX ADMIN — AMLODIPINE BESYLATE 10 MG: 5 TABLET ORAL at 08:06

## 2023-06-18 RX ADMIN — ATORVASTATIN CALCIUM 40 MG: 40 TABLET, FILM COATED ORAL at 08:06

## 2023-06-18 RX ADMIN — LABETALOL HYDROCHLORIDE 200 MG: 200 TABLET, FILM COATED ORAL at 08:06

## 2023-06-18 NOTE — PROGRESS NOTES
Patient was not seen today, chart reviewed     -Etiology likely small vessel disease  -BS positive, planning for ZEYAD on Monday  -The positive bubble study is not likely the etiology of his stroke, would benefit from antiplatelet therapy and compliance with medications   -Follow up in stroke clinic within 2-4 weeks of hospital discharge if d/c home, if he goes to rehab 4-6 weeks for follow up     -signing off, please call with questions         Antithrombotics for secondary stroke prevention: Antiplatelets: Aspirin: 81 mg daily     Statins for secondary stroke prevention and hyperlipidemia, if present:   Statins: Atorvastatin- 40 mg daily     Aggressive risk factor modification: HTN, HLD, VIRI s/p stent, etoh abuse     Rehab efforts: The patient has been evaluated by a stroke team provider and the therapy needs have been fully considered based off the presenting complaints and exam findings. The following therapy evaluations are needed: PT evaluate and treat, OT evaluate and treat, SLP evaluate and treat     Diagnostics ordered/pending: TTE to assess cardiac function/status      Stroke workup  MRI brain: left internal capsule posterior limb acute infarct  CTA head and neck:   LDL: 100.6  A1c: 4.7  TSH: 4.380  CTA head and neck:   1.  Calcified plaques bilateral proximal internal carotid arteries without hemodynamically significant stenosis.  2.  Right internal carotid artery cavernous portion 50-60% stenosis        VTE prophylaxis: Enoxaparin 40 mg SQ every 24 hours     BP parameters: Infarct: No intervention, SBP <140 (GOAL BP)

## 2023-06-18 NOTE — PLAN OF CARE
Problem: Adult Inpatient Plan of Care  Goal: Plan of Care Review  Outcome: Ongoing, Progressing  Goal: Patient-Specific Goal (Individualized)  Outcome: Ongoing, Progressing  Goal: Absence of Hospital-Acquired Illness or Injury  Outcome: Ongoing, Progressing  Goal: Optimal Comfort and Wellbeing  Outcome: Ongoing, Progressing  Goal: Readiness for Transition of Care  Outcome: Ongoing, Progressing     Problem: Adjustment to Illness (Stroke, Ischemic/Transient Ischemic Attack)  Goal: Optimal Coping  Outcome: Ongoing, Progressing     Problem: Bowel Elimination Impaired (Stroke, Ischemic/Transient Ischemic Attack)  Goal: Effective Bowel Elimination  Outcome: Ongoing, Progressing     Problem: Cerebral Tissue Perfusion (Stroke, Ischemic/Transient Ischemic Attack)  Goal: Optimal Cerebral Tissue Perfusion  Outcome: Ongoing, Progressing     Problem: Cognitive Impairment (Stroke, Ischemic/Transient Ischemic Attack)  Goal: Optimal Cognitive Function  Outcome: Ongoing, Progressing     Problem: Functional Ability Impaired (Stroke, Ischemic/Transient Ischemic Attack)  Goal: Optimal Functional Ability  Outcome: Ongoing, Progressing     Problem: Respiratory Compromise (Stroke, Ischemic/Transient Ischemic Attack)  Goal: Effective Oxygenation and Ventilation  Outcome: Ongoing, Progressing     Problem: Sensorimotor Impairment (Stroke, Ischemic/Transient Ischemic Attack)  Goal: Improved Sensorimotor Function  Outcome: Ongoing, Progressing     Problem: Swallowing Impairment (Stroke, Ischemic/Transient Ischemic Attack)  Goal: Optimal Eating and Swallowing without Aspiration  Outcome: Ongoing, Progressing     Problem: Skin Injury Risk Increased  Goal: Skin Health and Integrity  Outcome: Ongoing, Progressing

## 2023-06-18 NOTE — PROGRESS NOTES
[facility] Hospital Medicine Progress Note        Chief Complaint: Inpatient Follow-up    HPI:   67-year-old male with significant history of HTN, HLD, prior CVA with unknown residual deficit, carotid artery stenosis status post right-sided carotid stent, former smoker quit 9 months back, alcohol abuse presented to the hospital as a transfer from outlying facility for acute CVA.  Patient went to bed normal on 06/12 and woke up on 06/13 with acute right-sided weakness, facial droop, slurry speech.  Patient was admitted to our ICU on 06/14.  Patient was severely hypertensive with systolic in 200s.  CT head was negative.  Initiated Cardene drip.  Neurology services consulted.  Stroke workup ordered.  MRI brain confirmed left internal capsule posterior limb acute infarct.  CT angiogram with no hemodynamically significant stenosis in internal carotid, right ICA 50-60% stenosis.  Echocardiogram pending paid neurology recommended aspirin, statin.  Weaned off Cardene drip and downgraded to hospitalist medicine service on 06/15.  Neurology recommending low-dose aspirin, statin.  Will need tighter control of blood pressure. initiated scheduled amlodipine, labetalol.  CT angiogram showed 50-60% stenosis on right ICA, patient has had previous stenting in the past.  Echocardiogram pending.  Therapy Services following. cleared for modified diet.  Still having swallowing difficulty as of 6/16 and therefore initiated Clinimix.  Labetalol increased to 300 mg b.i.d. for better control of blood pressure.  Bubble study positive and therefore cardiology consulted for ZEYAD.  Plan for 6/19.  Patient hypotensive on 06/17.  Treated with IV fluid bolus with improvement.  Antihypertensives adjusted, dose lowered    Interval Hx:     Patient seen at bedside.  BP labile.  Intermittent hypo and hypertension . asymptomatic.  No new neurological complaints.  no acute events overnight.  Tolerating dysphagia diet without  much difficulty.    Objective/physical exam:  General: In no acute distress, afebrile  Chest: Clear to auscultation bilaterally  Heart: S1, S2, no appreciable murmur  Abdomen: Soft, nontender, BS +  MSK: Warm, no lower extremity edema, no clubbing or cyanosis  Neurologic: alert, awake, oriented.  expressive aphasia, dysarthria, right-sided weakness.     VITAL SIGNS: 24 HRS MIN & MAX LAST   Temp  Min: 96.8 °F (36 °C)  Max: 98.3 °F (36.8 °C) 96.8 °F (36 °C)   BP  Min: 76/55  Max: 161/98 (!) 150/87   Pulse  Min: 75  Max: 95  86   Resp  Min: 16  Max: 26 19   SpO2  Min: 94 %  Max: 100 % 99 %       Recent Labs   Lab 06/18/23  0109   WBC 7.46   RBC 4.38*   HGB 13.6*   HCT 39.7*   MCV 90.6   MCH 31.1*   MCHC 34.3   RDW 13.5      MPV 11.1*         Recent Labs   Lab 06/18/23  0109      K 3.9   CO2 19*   BUN 14.9   CREATININE 0.87   CALCIUM 8.3*   ALBUMIN 3.1*   ALKPHOS 64   ALT 20   AST 32   BILITOT 0.7          Microbiology Results (last 7 days)       ** No results found for the last 168 hours. **             Scheduled Med:   amLODIPine  10 mg Oral Daily    aspirin  81 mg Oral Daily    atorvastatin  40 mg Oral Daily    enoxparin  40 mg Subcutaneous Q24H (prophylaxis, 1700)    folic acid  1 mg Oral Daily    labetaloL  200 mg Oral Q12H    multivitamin  1 tablet Oral Daily    mupirocin   Nasal BID    sodium bicarbonate  650 mg Oral BID    thiamine  100 mg Oral Daily          Assessment/Plan:    Acute ischemic CVA-left internal capsule posterior limb infarct   Right-sided weakness/dysarthria/expressive aphasia/facial droop secondary to above  Poorly-controlled HTN admitted with hypertensive emergency-improved  Hypotension on 06/17-improved/resolved  Positive bubble study  HLD   History of carotid artery stenosis status post right-sided carotid stent placement   Former tobacco use  Alcohol abuse  Medical noncompliance  Prophylaxis    Patient is on low-dose aspirin, statin as recommended by neurology   Ultrasound  carotid with less than 50% stenosis bilaterally   Bubble study positive   Cardiology consulted and they are planning for ZEYAD on 06/19  NPO after midnight on Sunday   Labile BP with intermittent hypo and hypertension  Adjusted antihypertensives to amlodipine 10 mg daily and labetalol 200 mg b.i.d.  Continue to closely monitor  Continue aggressive PT/OT/ST  Better tolerating p.o. diet  Continue multivitamin, thiamine, folic acid  DVT prophylaxis-Lovenox     Case management consulted for inpatient rehab placement  Await ZEYAD on Monday           Lilia Woods MD   06/18/2023

## 2023-06-18 NOTE — NURSING
Nurses Note -- 4 Eyes      6/18/2023   5:16 PM      Skin assessed during: Daily Assessment      [x] No Altered Skin Integrity Present    [x]Prevention Measures Documented      [] Yes- Altered Skin Integrity Present or Discovered   [] LDA Added if Not in Epic (Describe Wound)   [] New Altered Skin Integrity was Present on Admit and Documented in LDA   [] Wound Image Taken    Wound Care Consulted? No    Attending Nurse:  Dru Rios RN     Second RN/Staff Member:  Harpal Mcduffie RN

## 2023-06-19 ENCOUNTER — ANESTHESIA EVENT (OUTPATIENT)
Dept: CARDIOLOGY | Facility: HOSPITAL | Age: 68
DRG: 065 | End: 2023-06-19
Payer: MEDICARE

## 2023-06-19 ENCOUNTER — ANESTHESIA (OUTPATIENT)
Dept: CARDIOLOGY | Facility: HOSPITAL | Age: 68
DRG: 065 | End: 2023-06-19
Payer: MEDICARE

## 2023-06-19 LAB
ANION GAP SERPL CALC-SCNC: 9 MEQ/L
BSA FOR ECHO PROCEDURE: 1.55 M2
BUN SERPL-MCNC: 18.1 MG/DL (ref 8.4–25.7)
CALCIUM SERPL-MCNC: 8.5 MG/DL (ref 8.8–10)
CHLORIDE SERPL-SCNC: 108 MMOL/L (ref 98–107)
CO2 SERPL-SCNC: 22 MMOL/L (ref 23–31)
CREAT SERPL-MCNC: 0.82 MG/DL (ref 0.73–1.18)
CREAT/UREA NIT SERPL: 22
GFR SERPLBLD CREATININE-BSD FMLA CKD-EPI: >60 MLS/MIN/1.73/M2
GLUCOSE SERPL-MCNC: 116 MG/DL (ref 82–115)
POTASSIUM SERPL-SCNC: 4.1 MMOL/L (ref 3.5–5.1)
SODIUM SERPL-SCNC: 139 MMOL/L (ref 136–145)
T4 FREE SERPL-MCNC: 1.09 NG/DL (ref 0.7–1.48)

## 2023-06-19 PROCEDURE — 25000003 PHARM REV CODE 250: Performed by: INTERNAL MEDICINE

## 2023-06-19 PROCEDURE — 25000003 PHARM REV CODE 250: Performed by: STUDENT IN AN ORGANIZED HEALTH CARE EDUCATION/TRAINING PROGRAM

## 2023-06-19 PROCEDURE — D9220A PRA ANESTHESIA: Mod: CRNA,,, | Performed by: STUDENT IN AN ORGANIZED HEALTH CARE EDUCATION/TRAINING PROGRAM

## 2023-06-19 PROCEDURE — 63600175 PHARM REV CODE 636 W HCPCS: Performed by: NURSE PRACTITIONER

## 2023-06-19 PROCEDURE — 21400001 HC TELEMETRY ROOM

## 2023-06-19 PROCEDURE — 80048 BASIC METABOLIC PNL TOTAL CA: CPT | Performed by: INTERNAL MEDICINE

## 2023-06-19 PROCEDURE — 63600175 PHARM REV CODE 636 W HCPCS: Performed by: STUDENT IN AN ORGANIZED HEALTH CARE EDUCATION/TRAINING PROGRAM

## 2023-06-19 PROCEDURE — D9220A PRA ANESTHESIA: Mod: ANES,,, | Performed by: ANESTHESIOLOGY

## 2023-06-19 PROCEDURE — D9220A PRA ANESTHESIA: ICD-10-PCS | Mod: ANES,,, | Performed by: ANESTHESIOLOGY

## 2023-06-19 PROCEDURE — 97530 THERAPEUTIC ACTIVITIES: CPT | Mod: CQ

## 2023-06-19 PROCEDURE — 84439 ASSAY OF FREE THYROXINE: CPT | Performed by: INTERNAL MEDICINE

## 2023-06-19 PROCEDURE — D9220A PRA ANESTHESIA: ICD-10-PCS | Mod: CRNA,,, | Performed by: STUDENT IN AN ORGANIZED HEALTH CARE EDUCATION/TRAINING PROGRAM

## 2023-06-19 RX ORDER — ENOXAPARIN SODIUM 100 MG/ML
40 INJECTION SUBCUTANEOUS EVERY 24 HOURS
Start: 2023-06-19 | End: 2023-06-22 | Stop reason: HOSPADM

## 2023-06-19 RX ORDER — LANOLIN ALCOHOL/MO/W.PET/CERES
100 CREAM (GRAM) TOPICAL DAILY
Start: 2023-06-19 | End: 2023-06-22 | Stop reason: HOSPADM

## 2023-06-19 RX ORDER — PROPOFOL 10 MG/ML
VIAL (ML) INTRAVENOUS
Status: DISCONTINUED | OUTPATIENT
Start: 2023-06-19 | End: 2023-06-19

## 2023-06-19 RX ORDER — LABETALOL 200 MG/1
200 TABLET, FILM COATED ORAL EVERY 12 HOURS
Qty: 60 TABLET | Refills: 0
Start: 2023-06-19 | End: 2023-07-19

## 2023-06-19 RX ORDER — FOLIC ACID 1 MG/1
1 TABLET ORAL DAILY
Refills: 0
Start: 2023-06-19 | End: 2024-06-18

## 2023-06-19 RX ORDER — NAPROXEN SODIUM 220 MG/1
81 TABLET, FILM COATED ORAL DAILY
Refills: 0
Start: 2023-06-19 | End: 2024-06-18

## 2023-06-19 RX ORDER — LIDOCAINE HYDROCHLORIDE 20 MG/ML
INJECTION INTRAVENOUS
Status: DISCONTINUED | OUTPATIENT
Start: 2023-06-19 | End: 2023-06-19

## 2023-06-19 RX ADMIN — MUPIROCIN: 20 OINTMENT TOPICAL at 08:06

## 2023-06-19 RX ADMIN — PROPOFOL 20 MG: 10 INJECTION, EMULSION INTRAVENOUS at 01:06

## 2023-06-19 RX ADMIN — LIDOCAINE HYDROCHLORIDE 80 MG: 20 INJECTION INTRAVENOUS at 01:06

## 2023-06-19 RX ADMIN — AMLODIPINE BESYLATE 10 MG: 5 TABLET ORAL at 08:06

## 2023-06-19 RX ADMIN — PROPOFOL 40 MG: 10 INJECTION, EMULSION INTRAVENOUS at 01:06

## 2023-06-19 RX ADMIN — ATORVASTATIN CALCIUM 40 MG: 40 TABLET, FILM COATED ORAL at 03:06

## 2023-06-19 RX ADMIN — THERA TABS 1 TABLET: TAB at 03:06

## 2023-06-19 RX ADMIN — LABETALOL HYDROCHLORIDE 200 MG: 200 TABLET, FILM COATED ORAL at 08:06

## 2023-06-19 RX ADMIN — ENOXAPARIN SODIUM 40 MG: 40 INJECTION SUBCUTANEOUS at 05:06

## 2023-06-19 RX ADMIN — PROPOFOL 60 MG: 10 INJECTION, EMULSION INTRAVENOUS at 01:06

## 2023-06-19 RX ADMIN — SODIUM CHLORIDE, SODIUM GLUCONATE, SODIUM ACETATE, POTASSIUM CHLORIDE AND MAGNESIUM CHLORIDE: 526; 502; 368; 37; 30 INJECTION, SOLUTION INTRAVENOUS at 01:06

## 2023-06-19 RX ADMIN — THIAMINE HCL TAB 100 MG 100 MG: 100 TAB at 03:06

## 2023-06-19 RX ADMIN — SODIUM BICARBONATE 650 MG TABLET 650 MG: at 03:06

## 2023-06-19 RX ADMIN — SODIUM BICARBONATE 650 MG TABLET 650 MG: at 08:06

## 2023-06-19 RX ADMIN — FOLIC ACID 1 MG: 1 TABLET ORAL at 03:06

## 2023-06-19 RX ADMIN — ASPIRIN 81 MG CHEWABLE TABLET 81 MG: 81 TABLET CHEWABLE at 03:06

## 2023-06-19 NOTE — PLAN OF CARE
Problem: Adult Inpatient Plan of Care  Goal: Readiness for Transition of Care  Outcome: Ongoing, Progressing     Problem: Cerebral Tissue Perfusion (Stroke, Ischemic/Transient Ischemic Attack)  Goal: Optimal Cerebral Tissue Perfusion  Outcome: Ongoing, Progressing     Problem: Cognitive Impairment (Stroke, Ischemic/Transient Ischemic Attack)  Goal: Optimal Cognitive Function  Outcome: Ongoing, Progressing     Problem: Communication Impairment (Stroke, Ischemic/Transient Ischemic Attack)  Goal: Improved Communication Skills  Outcome: Ongoing, Progressing     Problem: Functional Ability Impaired (Stroke, Ischemic/Transient Ischemic Attack)  Goal: Optimal Functional Ability  Outcome: Ongoing, Progressing     Problem: Swallowing Impairment (Stroke, Ischemic/Transient Ischemic Attack)  Goal: Optimal Eating and Swallowing without Aspiration  Outcome: Ongoing, Progressing

## 2023-06-19 NOTE — PT/OT/SLP PROGRESS
Physical Therapy Treatment    Patient Name:  Wallace Lowry   MRN:  [id number]    Recommendations:     Discharge Recommendations: rehabilitation facility  Discharge Equipment Recommendations: to be determined by next level of care  Barriers to discharge: ongoing medical needs    Assessment:     Wallace Lowry is a 67 y.o. male admitted with a medical diagnosis of L acute ischemic CVA.  He presents with the following impairments/functional limitations: weakness, impaired endurance, impaired balance, gait instability, decreased upper extremity function, decreased lower extremity function, impaired coordination, impaired self care skills, impaired functional mobility, impaired fine motor.    Pt accepted to rehab however scheduled to have a ZEYAD at 1 p.m. today. Pt pleasant and motivated to participate. Intermittently tearful.    Rehab Prognosis: Good; patient would benefit from acute skilled PT services to address these deficits and reach maximum level of function.    Recent Surgery: * No surgery found *      Plan:     During this hospitalization, patient to be seen 6 x/week to address the identified rehab impairments via gait training, therapeutic activities, therapeutic exercises and progress toward the following goals:    Plan of Care Expires:  07/16/23    Subjective     Chief Complaint: Pt became emotional at times during session   Patient/Family Comments/goals: to get stronger  Pain/Comfort:  Pain Rating 1: 0/10      Objective:     Communicated with NSG prior to session.  Patient found HOB elevated with blood pressure cuff, zapata catheter, pulse ox (continuous), telemetry, SCD upon PTA entry to room.     General Precautions: Standard, aphasia (BP <140/90)  Orthopedic Precautions: N/A  Braces: N/A  Respiratory Status: Room air 96%  Blood Pressure: 148/88, HR 85  Skin Integrity: Visible skin intact    Functional Mobility:  Bed: Marie supine <-> sit EOB   T/F with SHAD UE support; min assist; R knee blocked to prevent  buckling  Side steps at EOB with mod assist for balance and to advance RLE, no major knee buckling.   Standing R hip flexion with max assist to complete full ROM      Education:  Patient provided with verbal education regarding POC and prevention of pressure ulcers.  Understanding was verbalized, however additional teaching warranted.     Patient left HOB elevated with all lines intact, call button in reach, and NSG notified. Returned to bed 2/2 upcoming procedure.     GOALS:   Multidisciplinary Problems       Physical Therapy Goals          Problem: Physical Therapy    Goal Priority Disciplines Outcome Goal Variances Interventions   Physical Therapy Goal     PT, PT/OT Ongoing, Progressing     Description: Goals to be met by: 23     Patient will increase functional independence with mobility by performin. Supine to sit with MInimal Assistance  2. Sit to supine with MInimal Assistance  3. Sit to stand transfer with Minimal Assistance  4. Bed to chair transfer with Minimal Assistance using LRAD  5. Gait  x 150 feet with Minimal Assistance using LRAD.                          Time Tracking:     PT Received On: 23  PT Start Time: 1145     PT Stop Time: 1208  PT Total Time (min): 23 min     Billable Minutes: Therapeutic Activity 2 units    Treatment Type: Treatment  PT/PTA: PTA     Number of PTA visits since last PT visit: 2023

## 2023-06-19 NOTE — TRANSFER OF CARE
"Anesthesia Transfer of Care Note    Patient: Wallace Lowry    Procedure(s) Performed: * No procedures listed *    Patient location: [facility]    Anesthesia Type: general    Transport from OR: Transported from OR on room air with adequate spontaneous ventilation    Post pain: adequate analgesia    Post assessment: no apparent anesthetic complications    Post vital signs: stable    Level of consciousness: awake    Nausea/Vomiting: no nausea/vomiting    Complications: none    Transfer of care protocol was followed      Last vitals:   Visit Vitals  /78   Pulse 85   Temp 36.4 °C (97.6 °F) (Oral)   Resp 14   Ht 5' 4" (1.626 m)   Wt 53.5 kg (117 lb 15.1 oz)   SpO2 99   BMI 20.25 kg/m²     "

## 2023-06-19 NOTE — PLAN OF CARE
Vanesa received a call from Tracy with [facility] Phone: [phone] regarding discharge planning. VANESA staffed with pt's nurse, Jessi who reported patient is scheduled to have a ZEYAD today. VANESA contacted Tracy and noted patient is not ready for discharge at this time.

## 2023-06-19 NOTE — NURSING
Nurses Note -- 4 Eyes      6/19/2023   3:28 AM      Skin assessed during: Daily Assessment      [x] No Altered Skin Integrity Present    [x]Prevention Measures Documented      [] Yes- Altered Skin Integrity Present or Discovered   [] LDA Added if Not in Epic (Describe Wound)   [] New Altered Skin Integrity was Present on Admit and Documented in LDA   [] Wound Image Taken    Wound Care Consulted? No    Attending Nurse:  Cleopatra Alaniz RN     Second RN/Staff Member:  Fahad Starks RN

## 2023-06-19 NOTE — PT/OT/SLP PROGRESS
Occupational Therapy      Patient Name:  Wallace Lowry   MRN:  [id number]    Attempted to see pt for OT tx. Pt off floor for ZEYAD. Will follow up as schedule permits.    6/19/2023

## 2023-06-19 NOTE — ANESTHESIA POSTPROCEDURE EVALUATION
Anesthesia Post Evaluation    Patient: Wallace Lowry    Procedure(s) Performed: * No procedures listed *    Final Anesthesia Type: general      Patient location during evaluation: Cath Lab  Patient participation: Yes- Able to Participate  Level of consciousness: awake and alert  Post-procedure vital signs: reviewed and stable  Pain management: adequate  Airway patency: patent    PONV status at discharge: No PONV  Anesthetic complications: no      Cardiovascular status: hemodynamically stable  Respiratory status: unassisted  Hydration status: euvolemic  Follow-up not needed.          Vitals Value Taken Time   /78 06/19/23 1328   Temp 36.4 °C (97.6 °F) 06/19/23 1200   Pulse 84 06/19/23 1330   Resp 27 06/19/23 1330   SpO2 100 % 06/19/23 1330   Vitals shown include unvalidated device data.      No case tracking events are documented in the log.      Pain/Luis Score: No data recorded

## 2023-06-19 NOTE — ANESTHESIA PREPROCEDURE EVALUATION
"                                                                                                             06/19/2023  Wallace Lowry is a 67 y.o., male with ----------------------------  Hypertension  Stroke    And No past surgical history on file.    For ZEYAD to r/o intracardiac shunt .  "   Chief Complaint: Reason for Consult: Abnormal ECHO/CVA     HPI: Mr. Lowry is a 66 y/o male who is known to [facility], Dr. Newsome (Bard). The patient presented to [facility] on 6.14.23 with c/o R Sided Weakness, R Sided Facial Droop and Slurred Speech. His initial BP was 225/113 and a CT of the Head without Contrast revealed No Acute Intracranial Hemorrhage. He was placed on a Cardene Drip and transferred to [facility] ICU for Neurology Services. He had a CTA of the Head/Neck which revealed Calcified Plaques at Bilateral Prox ICAs without Hemodynamically Significant Stenossi and 50-60% Stenosis of the R ICA Cavernous Portion. A MRI of the Brain without contrast revealed L Internal Capsule Posterior Limb Acute Non-Hemorrhagic Infarct. He was downgraded to Hospital Medicine and ECHO results revealed a Shunt and for this reason [facility] was consulted for a ZEYAD.     PMH: HTN, HLD, CVA, VIRI/Bilaterally Mild, ETOH Abuse, Tobacco Abuse, COPD, Syncope, Anxiety   PSH: R Carotid Stenting   Family History: Reviewed and Unremarkable for Heart Disease  Social History: Denies Illicit Drug Use; + Tobacco Use, 1 PPD for 40 + Years; + ETOH Use Daily/Unknown Amount.     Previous Cardiac Diagnostics:   Carotid US 6.15.23:  The right internal carotid artery is patent with less than 50% stenosis.  The left internal carotid artery is patent with less than 50% stenosis.   The bilateral vertebral arteries are patent with antegrade flow.     ECHO 6.15.23:  Study is positive for intercardiac shunt.  The left ventricle is normal in size with concentric remodeling and normal systolic function.  The estimated ejection fraction is 65%.  Indeterminate left ventricular " diastolic function.  Normal right ventricular size.  Mild-to-moderate aortic regurgitation.       Pre-op Assessment    I have reviewed the NPO Status.      Review of Systems      Physical Exam  General: Cooperative, Alert, Oriented and Anxious  Crying but appropriate -   Airway:  Mallampati: II   Mouth Opening: Normal  TM Distance: Normal  Tongue: Normal  Neck ROM: Normal ROM    Dental:  Periodontal disease  Poor dentition - most are missing - per pt none are loose  Chest/Lungs:  Clear to auscultation, Normal Respiratory Rate    Heart:  Rate: Normal  Rhythm: Regular Rhythm        Anesthesia Plan  Type of Anesthesia, risks & benefits discussed:    Anesthesia Type: Gen Natural Airway  Intra-op Monitoring Plan: Standard ASA Monitors  Post Op Pain Control Plan: IV/PO Opioids PRN  Induction:  IV  Airway Plan: Direct  Informed Consent: Informed consent signed with the Patient and all parties understand the risks and agree with anesthesia plan.  All questions answered. Patient consented to blood products? No  ASA Score: 3  Day of Surgery Review of History & Physical: H&P Update referred to the surgeon/provider.  Anesthesia Plan Notes: Nasal cannula vs facemask supplemental oxygenation   For patients with SLAVA/obesity, may consider SuperNoval Nasal CPAP      Ready For Surgery From Anesthesia Perspective.     .

## 2023-06-19 NOTE — PLAN OF CARE
VANESA was informed by pt's nurse, Divine that patient completed his ZEYAD and is okay to be discharged per Dr. Woods. VANESA contacted Tracy with [facility] and was told they can not accept patient today (6/19/23).  VANESA will follow up tomorrow.

## 2023-06-19 NOTE — PROGRESS NOTES
[facility] - [facility] East ICU  Cardiology  Progress Note    Patient Name: Wallace Lowry  MRN: [id number]  Admission Date: 6/14/2023  Hospital Length of Stay: 5 days  Code Status: Full Code   Attending Provider: Lilia Woods MD   Consulting Provider: NICK Goldberg  Primary Care Physician: Primary Doctor No  Principal Problem:<principal problem not specified>    Patient information was obtained from patient, past medical records, and ER records.     Subjective:     Chief Complaint: Reason for Consult: Abnormal ECHO/CVA    HPI: Mr. Lowry is a 66 y/o male who is known to [facility], Dr. Newsome ([place]). The patient presented to [facility] on 6.14.23 with c/o R Sided Weakness, R Sided Facial Droop and Slurred Speech. His initial BP was 225/113 and a CT of the Head without Contrast revealed No Acute Intracranial Hemorrhage. He was placed on a Cardene Drip and transferred to [facility] ICU for Neurology Services. He had a CTA of the Head/Neck which revealed Calcified Plaques at Bilateral Prox ICAs without Hemodynamically Significant Stenossi and 50-60% Stenosis of the R ICA Cavernous Portion. A MRI of the Brain without contrast revealed L Internal Capsule Posterior Limb Acute Non-Hemorrhagic Infarct. He was downgraded to Hospital Medicine and ECHO results revealed a Shunt and for this reason [facility] was consulted for a ZEYAD.    PMH: HTN, HLD, CVA, VIRI/Bilaterally Mild, ETOH Abuse, Tobacco Abuse, COPD, Syncope, Anxiety   PSH: R Carotid Stenting   Family History: Reviewed and Unremarkable for Heart Disease  Social History: Denies Illicit Drug Use; + Tobacco Use, 1 PPD for 40 + Years; + ETOH Use Daily/Unknown Amount.    Previous Cardiac Diagnostics:   Carotid US 6.15.23:  The right internal carotid artery is patent with less than 50% stenosis.  The left internal carotid artery is patent with less than 50% stenosis.   The bilateral vertebral arteries are patent with antegrade flow.    ECHO 6.15.23:  Study is positive for  intercardiac shunt.  The left ventricle is normal in size with concentric remodeling and normal systolic function.  The estimated ejection fraction is 65%.  Indeterminate left ventricular diastolic function.  Normal right ventricular size.  Mild-to-moderate aortic regurgitation.    Stress ECHO 5.23.19:  Stress Testing Impression:  Stress EKG is non-diagnostic.   Sub-maximal exercise treadmill test (MPHR : 72 %).  The functional capacity is fair (7.1 METs).  The heart rate recovery is normal.   Stress ECHO Impression:  The study quality is good.   During peak exercise ejection fraction has improved from 60% to 70%.   Mild (1+) aortic regurgitation was noted during stress.    Carotid US 1.10.19:  The study quality is good.   1-39% stenosis in the proximal right internal carotid artery based on Bluth Criteria.   1-39% stenosis in the proximal left internal carotid artery based on Bluth Criteria.   Antegrade right vertebral artery flow.   Antegrade left vertebral artery flow.     ECHO 1.10.19:  The left ventricle is decreased in size.  Global left ventricular systolic function is normal.  The left ventricular ejection fraction is 60%. Left ventricular diastolic function is normal. Mild to moderate concentric left ventricular hypertrophy is present.  Dilatation of the aortic root is noted. Aortic root diameter is 4.0 cms.   Mild calcification of the aortic valve is noted with adequate cuspal excursion.   Mild to moderate (1-2+) aortic regurgitation. Mild (1+) mitral regurgitation.   The pulmonary artery systolic pressure is 22 mmHg.   right atrial echodensity likely prominent eustachian valve, consider ZEYAD if clinically indicated.    No past surgical history on file.    Review of patient's allergies indicates:   Allergen Reactions    Shellfish derived      No current facility-administered medications on file prior to encounter.     Current Outpatient Medications on File Prior to Encounter   Medication Sig    amLODIPine  (NORVASC) 10 MG tablet Take 10 mg by mouth nightly.    atorvastatin (LIPITOR) 80 MG tablet Take 80 mg by mouth once daily.    clopidogreL (PLAVIX) 75 mg tablet Take 75 mg by mouth once daily.    hydroCHLOROthiazide (HYDRODIURIL) 25 MG tablet Take 25 mg by mouth once daily.    metoprolol succinate (TOPROL-XL) 100 MG 24 hr tablet Take 100 mg by mouth once daily.     Review of Systems   Unable to perform ROS: Acuity of condition     Objective:     Vital Signs (Most Recent):  Temp: 98.1 °F (36.7 °C) (06/19/23 0800)  Pulse: 82 (06/19/23 1000)  Resp: 17 (06/19/23 1000)  BP: 119/75 (06/19/23 1000)  SpO2: 95 % (06/19/23 1000) Vital Signs (24h Range):  Temp:  [97.7 °F (36.5 °C)-98.7 °F (37.1 °C)] 98.1 °F (36.7 °C)  Pulse:  [79-96] 82  Resp:  [16-22] 17  SpO2:  [95 %-100 %] 95 %  BP: ()/(62-95) 119/75     Weight: 53.5 kg (117 lb 15.1 oz)  Body mass index is 20.25 kg/m².    SpO2: 95 %         Intake/Output Summary (Last 24 hours) at 6/19/2023 1136  Last data filed at 6/19/2023 0600  Gross per 24 hour   Intake --   Output 1300 ml   Net -1300 ml       Lines/Drains/Airways       Drain  Duration             Male External Urinary Catheter 06/16/23 1147 2 days              Peripheral Intravenous Line  Duration                  Peripheral IV - Single Lumen 06/16/23 1101 18 G Left Hand 3 days                  Significant Labs:  Recent Results (from the past 72 hour(s))   Comprehensive Metabolic Panel    Collection Time: 06/18/23  1:09 AM   Result Value Ref Range    Sodium Level 139 136 - 145 mmol/L    Potassium Level 3.9 3.5 - 5.1 mmol/L    Chloride 110 (H) 98 - 107 mmol/L    Carbon Dioxide 19 (L) 23 - 31 mmol/L    Glucose Level 86 82 - 115 mg/dL    Blood Urea Nitrogen 14.9 8.4 - 25.7 mg/dL    Creatinine 0.87 0.73 - 1.18 mg/dL    Calcium Level Total 8.3 (L) 8.8 - 10.0 mg/dL    Protein Total 5.6 (L) 5.8 - 7.6 gm/dL    Albumin Level 3.1 (L) 3.4 - 4.8 g/dL    Globulin 2.5 2.4 - 3.5 gm/dL    Albumin/Globulin Ratio 1.2 1.1 - 2.0  ratio    Bilirubin Total 0.7 <=1.5 mg/dL    Alkaline Phosphatase 64 40 - 150 unit/L    Alanine Aminotransferase 20 0 - 55 unit/L    Aspartate Aminotransferase 32 5 - 34 unit/L    eGFR >60 mls/min/1.73/m2   CBC with Differential    Collection Time: 06/18/23  1:09 AM   Result Value Ref Range    WBC 7.46 4.50 - 11.50 x10(3)/mcL    RBC 4.38 (L) 4.70 - 6.10 x10(6)/mcL    Hgb 13.6 (L) 14.0 - 18.0 g/dL    Hct 39.7 (L) 42.0 - 52.0 %    MCV 90.6 80.0 - 94.0 fL    MCH 31.1 (H) 27.0 - 31.0 pg    MCHC 34.3 33.0 - 36.0 g/dL    RDW 13.5 11.5 - 17.0 %    Platelet 196 130 - 400 x10(3)/mcL    MPV 11.1 (H) 7.4 - 10.4 fL    Neut % 74.8 %    Lymph % 14.3 %    Mono % 8.2 %    Eos % 1.6 %    Basophil % 0.7 %    Lymph # 1.07 0.6 - 4.6 x10(3)/mcL    Neut # 5.58 2.1 - 9.2 x10(3)/mcL    Mono # 0.61 0.1 - 1.3 x10(3)/mcL    Eos # 0.12 0 - 0.9 x10(3)/mcL    Baso # 0.05 <=0.2 x10(3)/mcL    IG# 0.03 0 - 0.04 x10(3)/mcL    IG% 0.4 %    NRBC% 0.0 %   Basic Metabolic Panel    Collection Time: 06/19/23  1:07 AM   Result Value Ref Range    Sodium Level 139 136 - 145 mmol/L    Potassium Level 4.1 3.5 - 5.1 mmol/L    Chloride 108 (H) 98 - 107 mmol/L    Carbon Dioxide 22 (L) 23 - 31 mmol/L    Glucose Level 116 (H) 82 - 115 mg/dL    Blood Urea Nitrogen 18.1 8.4 - 25.7 mg/dL    Creatinine 0.82 0.73 - 1.18 mg/dL    BUN/Creatinine Ratio 22     Calcium Level Total 8.5 (L) 8.8 - 10.0 mg/dL    Anion Gap 9.0 mEq/L    eGFR >60 mls/min/1.73/m2     Telemetry: SR    Physical Exam  Constitutional:       General: He is not in acute distress.     Appearance: Normal appearance. He is ill-appearing.   HENT:      Head: Normocephalic.      Mouth/Throat:      Mouth: Mucous membranes are moist.   Eyes:      Conjunctiva/sclera: Conjunctivae normal.   Cardiovascular:      Rate and Rhythm: Normal rate and regular rhythm.      Pulses: Normal pulses.      Heart sounds: Normal heart sounds.   Pulmonary:      Effort: Pulmonary effort is normal. No respiratory distress.   Abdominal:       Palpations: Abdomen is soft.   Skin:     General: Skin is warm.   Neurological:      Mental Status: He is alert.      Comments: Aphasic; R Sided Weakness     Home Medications:   No current facility-administered medications on file prior to encounter.     Current Outpatient Medications on File Prior to Encounter   Medication Sig Dispense Refill    amLODIPine (NORVASC) 10 MG tablet Take 10 mg by mouth nightly.      atorvastatin (LIPITOR) 80 MG tablet Take 80 mg by mouth once daily.      clopidogreL (PLAVIX) 75 mg tablet Take 75 mg by mouth once daily.      hydroCHLOROthiazide (HYDRODIURIL) 25 MG tablet Take 25 mg by mouth once daily.      metoprolol succinate (TOPROL-XL) 100 MG 24 hr tablet Take 100 mg by mouth once daily.       Current Inpatient Medications:    Current Facility-Administered Medications:     amLODIPine tablet 10 mg, 10 mg, Oral, Daily, Lilia Woods MD, 10 mg at 06/19/23 0855    aspirin chewable tablet 81 mg, 81 mg, Oral, Daily, Estrada Ulloa MD, 81 mg at 06/18/23 0833    atorvastatin tablet 40 mg, 40 mg, Oral, Daily, Estrada Ulloa MD, 40 mg at 06/18/23 0833    cloNIDine tablet 0.1 mg, 0.1 mg, Oral, Q6H PRN, Lilia Woods MD    enoxaparin injection 40 mg, 40 mg, Subcutaneous, Q24H (prophylaxis, 1700), Nieves Marquez NP, 40 mg at 06/18/23 1608    folic acid tablet 1 mg, 1 mg, Oral, Daily, Lilia Woods MD, 1 mg at 06/18/23 0833    hydrALAZINE injection 10 mg, 10 mg, Intravenous, Q4H PRN, Lilia Woods MD    labetaloL injection 10 mg, 10 mg, Intravenous, Q4H PRN, Lilia Woods MD, 10 mg at 06/16/23 0610    labetaloL tablet 200 mg, 200 mg, Oral, Q12H, Lilia Woods MD, 200 mg at 06/19/23 0854    LORazepam (ATIVAN) injection 1 mg, 1 mg, Intravenous, Q2H PRN, Estrada Ulloa MD    multivitamin tablet, 1 tablet, Oral, Daily, Estrada Ulloa MD, 1 tablet at 06/18/23 0833    sodium bicarbonate tablet 650 mg, 650 mg, Oral, BID, Lilia Woods MD, 650 mg at 06/18/23 2780    sodium chloride 0.9% flush  10 mL, 10 mL, Intravenous, PRN, Estrada Ulloa MD    thiamine tablet 100 mg, 100 mg, Oral, Daily, Lilia Woods MD, 100 mg at 06/18/23 0833    VTE Risk Mitigation (From admission, onward)           Ordered     enoxaparin injection 40 mg  Every 24 hours         06/15/23 1159     Place sequential compression device  Until discontinued         06/14/23 1646     IP VTE LOW RISK PATIENT  Once         06/14/23 1646                  Assessment:   Acute CVA     - MRI (6.14.23) - L Internal Capsule Posterior Limb Acute Nonhemorrhagic Infarct; Several Old Infarcts, Severe Chronic Microangiographic Ischemia and Atrophy  Abnormal ECHO     - ECHO (6.15.23) - LVEF 65%; LV is Normal in Size with Concentric Remodeling and Normal Systolic Function; Study is Positive for Intercardiac Shunt  VIRI/Bilaterally Mild-Moderate     - CTA Head/Neck (6.14.23) - Calcified plaques bilateral proximal internal carotid arteries without hemodynamically significant stenosis. Right internal carotid artery cavernous portion 50-60% stenosis  HTN  HLD  ETOH Abuse  Tobacco Abuse  Medical Therapy Non-Compliance   No Hx of GIB    Plan:   Keep NPO  Will Proceed with ZEYAD to Evaluate for Intracardiac Shunt  Consent on Chart with Son  Continue ASA and Statin  See Operative Note  We will be available as needed    Sujrit Hernandez, NICK  Cardiology  [facility]  06/19/2023

## 2023-06-19 NOTE — PLAN OF CARE
VANESA sent updated clinicals to [facility] Phone: [phone] via [employer].

## 2023-06-19 NOTE — PT/OT/SLP PROGRESS
SLP attempting to see pt for therapy however pt off the floor for ZEYAD. Will continue to follow and treat as appropriate.

## 2023-06-19 NOTE — PROGRESS NOTES
[facility] Hospital Medicine Progress Note        Chief Complaint: Inpatient Follow-up    HPI:   67-year-old male with significant history of HTN, HLD, prior CVA with unknown residual deficit, carotid artery stenosis status post right-sided carotid stent, former smoker quit 9 months back, alcohol abuse presented to the hospital as a transfer from outlying facility for acute CVA.  Patient went to bed normal on 06/12 and woke up on 06/13 with acute right-sided weakness, facial droop, slurry speech.  Patient was admitted to our ICU on 06/14.  Patient was severely hypertensive with systolic in 200s.  CT head was negative.  Initiated Cardene drip.  Neurology services consulted.  Stroke workup ordered.  MRI brain confirmed left internal capsule posterior limb acute infarct.  CT angiogram with no hemodynamically significant stenosis in internal carotid, right ICA 50-60% stenosis.  Echocardiogram pending paid neurology recommended aspirin, statin.  Weaned off Cardene drip and downgraded to hospitalist medicine service on 06/15.  Neurology recommending low-dose aspirin, statin.  Will need tighter control of blood pressure. initiated scheduled amlodipine, labetalol.  CT angiogram showed 50-60% stenosis on right ICA, patient has had previous stenting in the past.  Echocardiogram pending.  Therapy Services following. cleared for modified diet.  Still having swallowing difficulty as of 6/16 and therefore initiated Clinimix.  Labetalol increased to 300 mg b.i.d. for better control of blood pressure.  Bubble study positive and therefore cardiology consulted for OZZIE.  Plan for 6/19.  Patient hypotensive on 06/17.  Treated with IV fluid bolus with improvement.  Antihypertensives adjusted, dose lowered.  BP stable on 06/18.  Ozzie scheduled for 6/19.    Interval Hx:     Patient seen at bedside.  Comfortably laying in bed.  I saw him before OZZIE.  NPO.  BP labile.  Normotensive at the time of my  rounds.    Objective/physical exam:  General: In no acute distress, afebrile  Chest: Clear to auscultation bilaterally  Heart: S1, S2, no appreciable murmur  Abdomen: Soft, nontender, BS +  MSK: Warm, no lower extremity edema, no clubbing or cyanosis  Neurologic: alert, awake, oriented.  expressive aphasia, dysarthria, right-sided weakness.     VITAL SIGNS: 24 HRS MIN & MAX LAST   Temp  Min: 97.7 °F (36.5 °C)  Max: 98.7 °F (37.1 °C) 98.7 °F (37.1 °C)   BP  Min: 92/62  Max: 163/95 (!) 163/95   Pulse  Min: 79  Max: 96  90   Resp  Min: 16  Max: 22 17   SpO2  Min: 95 %  Max: 100 % 100 %       Recent Labs   Lab 06/18/23  0109   WBC 7.46   RBC 4.38*   HGB 13.6*   HCT 39.7*   MCV 90.6   MCH 31.1*   MCHC 34.3   RDW 13.5      MPV 11.1*         Recent Labs   Lab 06/18/23  0109 06/19/23  0107    139   K 3.9 4.1   CO2 19* 22*   BUN 14.9 18.1   CREATININE 0.87 0.82   CALCIUM 8.3* 8.5*   ALBUMIN 3.1*  --    ALKPHOS 64  --    ALT 20  --    AST 32  --    BILITOT 0.7  --           Microbiology Results (last 7 days)       ** No results found for the last 168 hours. **             Scheduled Med:   amLODIPine  10 mg Oral Daily    aspirin  81 mg Oral Daily    atorvastatin  40 mg Oral Daily    enoxparin  40 mg Subcutaneous Q24H (prophylaxis, 1700)    folic acid  1 mg Oral Daily    labetaloL  200 mg Oral Q12H    multivitamin  1 tablet Oral Daily    mupirocin   Nasal BID    sodium bicarbonate  650 mg Oral BID    thiamine  100 mg Oral Daily          Assessment/Plan:    Acute ischemic CVA-left internal capsule posterior limb infarct   Right-sided weakness/dysarthria/expressive aphasia/facial droop secondary to above  Poorly-controlled HTN admitted with hypertensive emergency-improved  Labile BP with hypotension on 06/17-improved  Positive bubble study on TTE with negative ZEYAD  HLD   History of carotid artery stenosis status post right-sided carotid stent placement   Former tobacco use  Alcohol abuse  Medical  noncompliance  Prophylaxis    Patient is on low-dose aspirin, statin as recommended by neurology   Ultrasound carotid with less than 50% stenosis bilaterally   Bowel study was positive on TTE, manolo is negative  Labile BP with intermittent hypo and hypertension  On amlodipine 10 mg daily and labetalol 200 mg b.i.d.  Continue to closely monitor  Continue aggressive PT/OT/ST  Better tolerating p.o. diet  Continue multivitamin, thiamine, folic acid  DVT prophylaxis-Lovenox     Case management consulted for inpatient rehab placement  Medically stable for placement           Lilia Woods MD   06/19/2023

## 2023-06-20 PROBLEM — E44.0 MODERATE MALNUTRITION: Status: ACTIVE | Noted: 2023-06-20

## 2023-06-20 PROCEDURE — 51798 US URINE CAPACITY MEASURE: CPT

## 2023-06-20 PROCEDURE — 97535 SELF CARE MNGMENT TRAINING: CPT

## 2023-06-20 PROCEDURE — 97110 THERAPEUTIC EXERCISES: CPT

## 2023-06-20 PROCEDURE — 25000003 PHARM REV CODE 250: Performed by: STUDENT IN AN ORGANIZED HEALTH CARE EDUCATION/TRAINING PROGRAM

## 2023-06-20 PROCEDURE — 25000003 PHARM REV CODE 250: Performed by: INTERNAL MEDICINE

## 2023-06-20 PROCEDURE — 63600175 PHARM REV CODE 636 W HCPCS: Performed by: NURSE PRACTITIONER

## 2023-06-20 PROCEDURE — 21400001 HC TELEMETRY ROOM

## 2023-06-20 PROCEDURE — 92526 ORAL FUNCTION THERAPY: CPT

## 2023-06-20 PROCEDURE — 97530 THERAPEUTIC ACTIVITIES: CPT | Mod: CQ

## 2023-06-20 RX ADMIN — FOLIC ACID 1 MG: 1 TABLET ORAL at 08:06

## 2023-06-20 RX ADMIN — LABETALOL HYDROCHLORIDE 200 MG: 200 TABLET, FILM COATED ORAL at 08:06

## 2023-06-20 RX ADMIN — ATORVASTATIN CALCIUM 40 MG: 40 TABLET, FILM COATED ORAL at 08:06

## 2023-06-20 RX ADMIN — ASPIRIN 81 MG CHEWABLE TABLET 81 MG: 81 TABLET CHEWABLE at 08:06

## 2023-06-20 RX ADMIN — ENOXAPARIN SODIUM 40 MG: 40 INJECTION SUBCUTANEOUS at 06:06

## 2023-06-20 RX ADMIN — THERA TABS 1 TABLET: TAB at 08:06

## 2023-06-20 RX ADMIN — THIAMINE HCL TAB 100 MG 100 MG: 100 TAB at 08:06

## 2023-06-20 RX ADMIN — AMLODIPINE BESYLATE 10 MG: 5 TABLET ORAL at 08:06

## 2023-06-20 NOTE — NURSING
Nurses Note -- 4 Eyes      6/20/2023   5:26 AM      Skin assessed during: Daily Assessment      [x] No Altered Skin Integrity Present    [x]Prevention Measures Documented      [] Yes- Altered Skin Integrity Present or Discovered   [] LDA Added if Not in Epic (Describe Wound)   [] New Altered Skin Integrity was Present on Admit and Documented in LDA   [] Wound Image Taken    Wound Care Consulted? No    Attending Nurse:  Marii Espinal RN     Second RN/Staff Member:  Trinidad Saenz, ESTRELLITA

## 2023-06-20 NOTE — PLAN OF CARE
Problem: Adult Inpatient Plan of Care  Goal: Plan of Care Review  Outcome: Ongoing, Progressing  Goal: Patient-Specific Goal (Individualized)  Outcome: Ongoing, Progressing  Goal: Absence of Hospital-Acquired Illness or Injury  Outcome: Ongoing, Progressing  Goal: Optimal Comfort and Wellbeing  Outcome: Ongoing, Progressing  Goal: Readiness for Transition of Care  Outcome: Ongoing, Progressing     Problem: Adjustment to Illness (Stroke, Ischemic/Transient Ischemic Attack)  Goal: Optimal Coping  Outcome: Ongoing, Progressing     Problem: Bowel Elimination Impaired (Stroke, Ischemic/Transient Ischemic Attack)  Goal: Effective Bowel Elimination  Outcome: Ongoing, Progressing     Problem: Cerebral Tissue Perfusion (Stroke, Ischemic/Transient Ischemic Attack)  Goal: Optimal Cerebral Tissue Perfusion  Outcome: Ongoing, Progressing     Problem: Cognitive Impairment (Stroke, Ischemic/Transient Ischemic Attack)  Goal: Optimal Cognitive Function  Outcome: Ongoing, Progressing     Problem: Communication Impairment (Stroke, Ischemic/Transient Ischemic Attack)  Goal: Improved Communication Skills  Outcome: Ongoing, Progressing     Problem: Functional Ability Impaired (Stroke, Ischemic/Transient Ischemic Attack)  Goal: Optimal Functional Ability  Outcome: Ongoing, Progressing     Problem: Respiratory Compromise (Stroke, Ischemic/Transient Ischemic Attack)  Goal: Effective Oxygenation and Ventilation  Outcome: Ongoing, Progressing     Problem: Sensorimotor Impairment (Stroke, Ischemic/Transient Ischemic Attack)  Goal: Improved Sensorimotor Function  Outcome: Ongoing, Progressing     Problem: Swallowing Impairment (Stroke, Ischemic/Transient Ischemic Attack)  Goal: Optimal Eating and Swallowing without Aspiration  Outcome: Ongoing, Progressing     Problem: Skin Injury Risk Increased  Goal: Skin Health and Integrity  Outcome: Ongoing, Progressing

## 2023-06-20 NOTE — NURSING
Stroke Coordinator Rounding.    Patient sitting in chair while rounds, just finished his lunch. Patient with right sided weakness, and expressive aphasia. Very tearful during conversation however with patience and effort is able to communicate needs. Patient states that he has a lot of family support including 5 brothers, 1 daughter and 1 son.    Nursing states that the plan was for the patient to go to rehab today, however this was canceled due to bed availability.     Patient/Family Stroke Education:    I have discussed the patient's stroke risk factors and the importance to modify them in order to reduce the risk of future events.  Also, the importance of a healthy diet and daily exercise in order to reduce the risk of future strokes.    Written education given on the following:  I went over the usual stroke warning signs and symptoms, and the need to activate EMS (call 911) as soon as the symptoms present.  -Sudden onset numbness or weakness of the face, arm, or leg; especially on one side of the body  -Sudden confusion, trouble speaking, or understanding  -Sudden trouble seeing in one or both eyes  -Sudden trouble walking, dizziness, loss of coordination  -Sudden severe headache with no known cause  -Patient will have a follow up appointment in the stroke clinic.       Additional Handouts given: stroke support group, healthy eating plan,  communicative disorders clinic.

## 2023-06-20 NOTE — NURSING
Nurses Note -- 4 Eyes      6/20/2023   4:26 PM      Skin assessed during: Transfer      [] No Altered Skin Integrity Present    []Prevention Measures Documented      [] Yes- Altered Skin Integrity Present or Discovered   [] LDA Added if Not in Epic (Describe Wound)   [] New Altered Skin Integrity was Present on Admit and Documented in LDA   [] Wound Image Taken    Wound Care Consulted? No    Attending Nurse:  Yudi Sarah RN     Second RN/Staff Member:  Kourtney Hawthorne CNA

## 2023-06-20 NOTE — PLAN OF CARE
Problem: Adult Inpatient Plan of Care  Goal: Plan of Care Review  Outcome: Ongoing, Progressing  Goal: Patient-Specific Goal (Individualized)  Outcome: Ongoing, Progressing  Goal: Absence of Hospital-Acquired Illness or Injury  Outcome: Ongoing, Progressing  Goal: Optimal Comfort and Wellbeing  Outcome: Ongoing, Progressing     Problem: Adjustment to Illness (Stroke, Ischemic/Transient Ischemic Attack)  Goal: Optimal Coping  Outcome: Ongoing, Progressing  Intervention: Support Psychosocial Response to Stroke  Flowsheets (Taken 6/20/2023 0133)  Supportive Measures:   active listening utilized   counseling provided   positive reinforcement provided   decision-making supported   self-care encouraged

## 2023-06-20 NOTE — PT/OT/SLP PROGRESS
Occupational Therapy   Treatment    Name: Wallace Lowry  MRN: [id number]  Admitting Diagnosis:  Acute ischemic L CVA    Recommendations:     Discharge Recommendations: rehabilitation facility  Discharge Equipment Recommendations:   (TBD by next level of care.)  Barriers to discharge:       Assessment:     Wallace Lowry is a 67 y.o. male with a medical diagnosis of acute ischemic L CVA. Performance deficits affecting function are weakness, impaired endurance, impaired self care skills, impaired functional mobility, gait instability, impaired balance, decreased coordination, decreased upper extremity function, decreased lower extremity function, impaired coordination, impaired fine motor. Pt was found in chair and coloring a coloring sheet. Pt appeared with mixed emotions throughout tx, but stated he has a hard time controlling his emotions. Pt followed commands 85% of the time throughout session. Pt able to speak minimal words when prompted and yes and no responses. Pt would benefit from rehab placement upon d/c    Rehab Prognosis:  Good; patient would benefit from acute skilled OT services to address these deficits and reach maximum level of function.       Plan:     Patient to be seen 6 x/week to address the above listed problems via self-care/home management, therapeutic activities, therapeutic exercises, neuromuscular re-education, cognitive retraining  Plan of Care Expires: 06/30/23  Plan of Care Reviewed with: patient    Subjective     Pain/Comfort:  Pt demonstrated minimal grimacing and crying during session - stated unable to manage his emotions  No pain reported when asked     Objective:     Patient found up in chair with SCD, telemetry upon OT entry to room.    General Precautions: Standard, fall    Orthopedic Precautions:N/A  Braces: N/A  Respiratory Status: Room air  Vital Signs: Blood Pressure: 115/68  HR: 83     Occupational Performance:     Bed Mobility:    Patient completed Scooting/Bridging with  stand by assistance     Functional Mobility/Transfers:  Patient completed Sit <> Stand Transfer with moderate assistance  with  no assistive device   Patient completed Bed <> Chair Transfer using Stand Pivot technique with moderate assistance with no assistive device  Functional Mobility: Pt able to complete stand pivot transfer c occasional steps with mod A and RLE blocked to prevent buckling with no AD.     Activities of Daily Living:  Lower Body Dressing: Pt donned L sock seated in chair independently. Total A for R sock         Therapeutic Exercise:  Pt completed 10 sec of WB of RUE x 3 trials when seated EOB.   PROM R shoulder, elbow and, wrist extension/flexion completed to 90 degrees with gravity-5x  Pt able to fire muscles used for R elbow flexion in gravity eliminated position. ~5 degrees    Therapeutic Positioning    OT interventions performed during the course of today's session in an effort to prevent and/or reduce acquired pressure injuries:   Therapeutic positioning completed       Patient Education:  Patient provided with verbal education regarding OT role/goals/POC, fall prevention, safety awareness, and Discharge/DME recommendations.  Understanding was verbalized.      Patient left up in chair with all lines intact and call button in reach    GOALS:   Multidisciplinary Problems       Occupational Therapy Goals          Problem: Occupational Therapy    Goal Priority Disciplines Outcome Interventions   Occupational Therapy Goal     OT, PT/OT Ongoing, Progressing    Description: LTG: Pt will perform basic ADLs and ADL transfers with SBA using LRAD by discharge.    STG: to be met in 2 weeks    Pt will complete grooming standing at sink with LRAD with min a.  Pt will complete UB dressing with Min A.  Pt will complete LB dressing with Min A using LRAD.  Pt will complete toileting with min A using LRAD.  Pt will complete toilet transfer with min A using BSC and  LRAD.   Pt will demo increased strength in  RUE to 3-/5 MMT for increased functional use during ADL tasks.                       Time Tracking:     OT Date of Treatment: 06/20/23  OT Start Time: 1330  OT Stop Time: 1357  OT Total Time (min): 27 min    Billable Minutes:Self Care/Home Management 1 Unit  Therapeutic Exercise 1 unit          Number of SCOTTIE visits since last OT visit: 1    6/20/2023

## 2023-06-20 NOTE — PT/OT/SLP PROGRESS
Physical Therapy Treatment    Patient Name:  Wallace Lowry   MRN:  [id number]    Recommendations:     Discharge Recommendations: rehabilitation facility  Discharge Equipment Recommendations: to be determined by next level of care  Barriers to discharge: impaired mobility    Assessment:     Wallace Lowry is a 67 y.o. male admitted with a medical diagnosis of L acute ischemic CVA.  He presents with the following impairments/functional limitations: weakness, impaired endurance, impaired balance, gait instability, decreased upper extremity function, decreased lower extremity function, impaired coordination, impaired self care skills, impaired functional mobility, impaired fine motor.    Pt leaving ICU and going to 9th floor soon. Limited tx session 2/2 transfer. Pt tolerated mobility very well. Continues to be intermittently tearful.     Rehab Prognosis: Good; patient would benefit from acute skilled PT services to address these deficits and reach maximum level of function.    Recent Surgery: * No surgery found *      Plan:     During this hospitalization, patient to be seen 6 x/week to address the identified rehab impairments via gait training, therapeutic activities, therapeutic exercises and progress toward the following goals:    Plan of Care Expires:  07/16/23    Subjective     Chief Complaint: none  Patient/Family Comments/goals: to get stronger  Pain/Comfort:  Pain Rating 1: 0/10      Objective:     Communicated with NSG prior to session.  Patient found HOB elevated with blood pressure cuff, zapata catheter, pulse ox (continuous), telemetry, SCD upon PTA entry to room.     General Precautions: Standard, aphasia (BP <140/90)  Orthopedic Precautions: N/A  Braces: N/A  Respiratory Status: Room air 96%  Blood Pressure: 137/90, HR 89  Skin Integrity: Visible skin intact    Functional Mobility:  Bed mobility:   Supine to sit with min assist  Transfers:   Sit<->stand with min assist; R knee blocked to prevent  buckling  Stand pivot with min assist bed to recliner toward L side  Gait:  10' with handrail on L side, R leg advanced and blocked to prevent hyperextension and buckling, mod assistance.       Education:  Patient provided with verbal education regarding POC and prevention of pressure ulcers.  Understanding was verbalized, however additional teaching warranted.     Patient left up in chair with all lines intact, call button in reach, and NSG notified.    GOALS:   Multidisciplinary Problems       Physical Therapy Goals          Problem: Physical Therapy    Goal Priority Disciplines Outcome Goal Variances Interventions   Physical Therapy Goal     PT, PT/OT Ongoing, Progressing     Description: Goals to be met by: 23     Patient will increase functional independence with mobility by performin. Supine to sit with MInimal Assistance  2. Sit to supine with MInimal Assistance  3. Sit to stand transfer with Minimal Assistance  4. Bed to chair transfer with Minimal Assistance using LRAD  5. Gait  x 150 feet with Minimal Assistance using LRAD.                          Time Tracking:     PT Received On: 23  PT Start Time: 0816     PT Stop Time: 0833  PT Total Time (min): 17 min     Billable Minutes: Therapeutic Activity 1 unit    Treatment Type: Treatment  PT/PTA: PTA     Number of PTA visits since last PT visit: 2     2023

## 2023-06-20 NOTE — PT/OT/SLP PROGRESS
Speech Language Pathology Department  Dysphagia Therapy Progress Note    Patient Name:  Wallace Lowry   MRN:  [id number]      Recommendations:     General recommendations:  dysphagia therapy, speech/language therapy, and cognitive-linguistic therapy  Diet recommendations:  Puree Diet - IDDSI Level 4, Liquid Diet Level: Mildly thick liquids - IDDSI Level 2   Aspiration precautions: small bites/sips, slow rate, NO straws, assist with feeding as needed, and medications crushed in puree    Discharge recommendations:  rehabilitation facility   Barriers to safe discharge:  severity of impairment    Subjective     Patient awake and alert.    Pain/Comfort: Pain Rating 1: 0/10    Spiritual/Cultural/Bahai Beliefs/Practices that affect care: no        Objective:     Therapeutic Activities:  Pt completed laryngeal elevation exercises x10 with minimal-moderate cues.  Pt tolerated thermal stimulation to the anterior faucial pillars x20 with 90% swallow responses.  Delay 3-6 second delay, variable.    Assessment:     Pt continues to present with oropharyngeal dysphagia requiring diet modification to reduce the risk of aspiration.  Skilled SLP services continue to be warranted.    Goals:     Multidisciplinary Problems       SLP Goals          Problem: SLP    Goal Priority Disciplines Outcome   SLP Goal     SLP Ongoing, Progressing   Description:   LTG: Tolerate least restrictive PO diet with no clinical signs/sx aspiration  STGs:  1.  Pt will tolerate 2oz of ice chips by spoon with no clinical signs/sx aspiration.   2.  Complete lingual, labial, base of tongue, and laryngeal strengthening exercises with minimal cues  3.  Tolerate thermal stimulation to the anterior faucial pillars with 100% effortful swallow responses and delay less than 2 seconds.      LTG: Pt will improve communicative effectiveness to have needs/wants met with less than 10% communicative breakdown.  STGs:  1. Pt will increase speech intelligibility to 90%  at the phrase level with minimal cues.  2. Pt will increase speech intelligibility to 80% at the sentence level with minimal cues.  3. Pt will increase speech intelligibility to 80% at the conversation level with minimal cues.  4. Pt will use word finding strategies in conversation with minimal cues.      LTG: Pt will increase cognitive linguistic skills to increase safety awareness and independence  STGs:  1. Pt will recall 3-4 items with 80% accuracy following 5 minute filled delay with minimal cues.                     Patient Education:     Patient provided with verbal education regarding POC.  Understanding was verbalized, however additional teaching warranted.    Plan:     Will continue to follow and tx as appropriate.    SLP Follow-Up:  Yes   Patient to be seen:  5 x/week   Plan of Care expires:  06/29/23  Plan of Care reviewed with:  patient       Time Tracking:     SLP Treatment Date:   06/20/23  Speech Start Time:  1430  Speech Stop Time:  1440     Speech Total Time (min):  10 min    Billable minutes:  Treatment of Swallow Dysfunction, 10 minutes       06/20/2023

## 2023-06-20 NOTE — PLAN OF CARE
Spoke to [facility] with [facility], no beds available today. Tracy will notify once bed is available. Notified Dr. Woods.

## 2023-06-20 NOTE — PROGRESS NOTES
Inpatient Nutrition Assessment    Admit Date: 6/14/2023   Total duration of encounter: 6 days     Nutrition Recommendation/Prescription     Continue dysphagia diet, mildly thick liquids as tolerated and per SLP recs  Assist with feeds  RD to order Boost VHC BIDto provide an additional 530 kcal and 22 g protein per container  RD to monitor po intake and weight changes    Communication of Recommendations: reviewed with patient    Nutrition Assessment     Malnutrition Assessment/Nutrition-Focused Physical Exam    Malnutrition Context: social/environmental circumstances  Malnutrition Level: moderate        Subcutaneous Fat (Malnutrition): moderate depletion  Orbital Region (Subcutaneous Fat Loss): moderate depletion  Upper Arm Region (Subcutaneous Fat Loss): moderate depletion     Muscle Mass (Malnutrition): moderate depletion  Tenriism Region (Muscle Loss): moderate depletion  Clavicle Bone Region (Muscle Loss): moderate depletion  Clavicle and Acromion Bone Region (Muscle Loss): moderate depletion                          A minimum of two characteristics is recommended for diagnosis of either severe or non-severe malnutrition.    Chart Review    Reason Seen: length of stay    Malnutrition Screening Tool Results   Have you recently lost weight without trying?: No  Have you been eating poorly because of a decreased appetite?: No   MST Score: 0     Diagnosis:  Acute ischemic CVA-left internal capsule posterior limb infarct   Right-sided weakness/dysarthria/expressive aphasia/facial droop secondary to above  Poorly-controlled HTN admitted with hypertensive emergency-improved  Labile BP with hypotension on 06/17-improved  Positive bubble study on TTE with negative ZEAYD  Alcohol abuse  Medical noncompliance  Prophylaxis    Relevant Medical History: HTN, HLD, prior CVA, VIRI s/p right-sided stent, former smoker, alcohol abuse    Nutrition-Related Medications: folic acid, MVI, thiamine  Calorie Containing IV Medications: no  "significant kcals from medications at this time    Nutrition-Related Labs: : Cl-108, glu-116, carolynn-8.5      Diet/PN Order: Diet heart healthy Dysphagia Pureed; Mildly Thick Liquids (IDDSI Level 2)  Oral Supplement Order: Boost VHC  Tube Feeding Order: none  Appetite/Oral Intake: good/% of meals  Factors Affecting Nutritional Intake: difficulty/impaired swallowing  Food/Rastafarian/Cultural Preferences: none reported  Food Allergies: shellfish per EMR       Wound(s):       Comments    : pt reports good appetite currently and prior admission, with no GI issues. Pt reports not eating breakfast this AM d/t inability to set tray up; consider assist with feeds. Pt agreed to ONS at this time. -130 lb with recent weight gain. Weight of 132 lb and 117 lb on  noted. Unable to take weight at time of visit, pt sitting up in chair. Unsure of current weight at this time; no previous EMR weights. Will continue to monitor.    Anthropometrics    Height: 5' 4" (162.6 cm)    Last Weight: 53.5 kg (117 lb 15.1 oz) (23 1640) Weight Method: Standard Scale  BMI (Calculated): 20.2  BMI Classification: underweight (BMI less than 22 if >65 years of age)        Ideal Body Weight (IBW), Male: 130 lb     % Ideal Body Weight, Male (lb): 90.73 %                          Usual Weight Provided By: patient    Wt Readings from Last 5 Encounters:   23 53.5 kg (117 lb 15.1 oz)   23 60.2 kg (132 lb 12.8 oz)     Weight Change(s) Since Admission:  Admit Weight: 53.5 kg (117 lb 15.1 oz) (23 1640)      Estimated Needs    Weight Used For Calorie Calculations: 53.5 kg (117 lb 15.1 oz)  Energy Calorie Requirements (kcal): 1605 kcal (30 kcal/kg)  Energy Need Method: Kcal/kg  Weight Used For Protein Calculations: 53.5 kg (117 lb 15.1 oz)  Protein Requirements: 59-64 g (1.1-1.2 g/kg)  Fluid Requirements (mL): 1605 mL (1 mL/kcal)  Temp (24hrs), Av.8 °F (36.6 °C), Min:96.8 °F (36 °C), Max:98.6 °F (37 °C)   "     Enteral Nutrition    Patient not receiving enteral nutrition at this time.    Parenteral Nutrition    Patient not receiving parenteral nutrition support at this time.    Evaluation of Received Nutrient Intake    Calories: meeting estimated needs  Protein: meeting estimated needs    Patient Education    Not applicable.    Nutrition Diagnosis     PES: Malnutrition related to  social/environmental circumstances as evidenced by moderate muscle and fat wasting. (new)    Interventions/Goals     Intervention(s): modified composition of meals/snacks, commercial beverage, and collaboration with other providers  Goal: Consume % of oral supplements by follow-up. (new)    Monitoring & Evaluation     Dietitian will monitor food and beverage intake, energy intake, and weight.  Nutrition Risk/Follow-Up: moderate (follow-up in 3-5 days)   Please consult if re-assessment needed sooner.

## 2023-06-20 NOTE — PROGRESS NOTES
[facility] Hospital Medicine Progress Note        Chief Complaint: Inpatient Follow-up    HPI:   67-year-old male with significant history of HTN, HLD, prior CVA with unknown residual deficit, carotid artery stenosis status post right-sided carotid stent, former smoker quit 9 months back, alcohol abuse presented to the hospital as a transfer from outlying facility for acute CVA.  Patient went to bed normal on 06/12 and woke up on 06/13 with acute right-sided weakness, facial droop, slurry speech.  Patient was admitted to our ICU on 06/14.  Patient was severely hypertensive with systolic in 200s.  CT head was negative.  Initiated Cardene drip.  Neurology services consulted.  Stroke workup ordered.  MRI brain confirmed left internal capsule posterior limb acute infarct.  CT angiogram with no hemodynamically significant stenosis in internal carotid, right ICA 50-60% stenosis.  Echocardiogram pending paid neurology recommended aspirin, statin.  Weaned off Cardene drip and downgraded to hospitalist medicine service on 06/15.  Neurology recommending low-dose aspirin, statin.  Will need tighter control of blood pressure. initiated scheduled amlodipine, labetalol.  CT angiogram showed 50-60% stenosis on right ICA, patient has had previous stenting in the past.  Echocardiogram pending.  Therapy Services following. cleared for modified diet.  Still having swallowing difficulty as of 6/16 and therefore initiated Clinimix.  Labetalol increased to 300 mg b.i.d. for better control of blood pressure.  Bubble study positive and therefore cardiology consulted for OZZIE.  Plan for 6/19.  Patient hypotensive on 06/17.  Treated with IV fluid bolus with improvement.  Antihypertensives adjusted, dose lowered.  BP stable since 06/18.  Ozzie scheduled for 6/19.  Ozzie negative, no PFO.     Interval Hx:     Patient seen at bedside.  Comfortably sitting in the couch.  No new complaints.  No acute events overnight.   BP is at goal.       Objective/physical exam:  General: In no acute distress, afebrile  Chest: Clear to auscultation bilaterally  Heart: S1, S2, no appreciable murmur  Abdomen: Soft, nontender, BS +  MSK: Warm, no lower extremity edema, no clubbing or cyanosis  Neurologic: alert, awake, oriented.  expressive aphasia, dysarthria, right-sided weakness.     VITAL SIGNS: 24 HRS MIN & MAX LAST   Temp  Min: 96.8 °F (36 °C)  Max: 98.6 °F (37 °C) 97.5 °F (36.4 °C)   BP  Min: 91/67  Max: 148/103 124/78   Pulse  Min: 72  Max: 90  86   Resp  Min: 13  Max: 27 19   SpO2  Min: 92 %  Max: 100 % 95 %       Recent Labs   Lab 06/18/23  0109   WBC 7.46   RBC 4.38*   HGB 13.6*   HCT 39.7*   MCV 90.6   MCH 31.1*   MCHC 34.3   RDW 13.5      MPV 11.1*         Recent Labs   Lab 06/18/23 0109 06/19/23  0107    139   K 3.9 4.1   CO2 19* 22*   BUN 14.9 18.1   CREATININE 0.87 0.82   CALCIUM 8.3* 8.5*   ALBUMIN 3.1*  --    ALKPHOS 64  --    ALT 20  --    AST 32  --    BILITOT 0.7  --           Microbiology Results (last 7 days)       ** No results found for the last 168 hours. **             Scheduled Med:   amLODIPine  10 mg Oral Daily    aspirin  81 mg Oral Daily    atorvastatin  40 mg Oral Daily    enoxparin  40 mg Subcutaneous Q24H (prophylaxis, 1700)    folic acid  1 mg Oral Daily    labetaloL  200 mg Oral Q12H    multivitamin  1 tablet Oral Daily    thiamine  100 mg Oral Daily          Assessment/Plan:    Acute ischemic CVA-left internal capsule posterior limb infarct   Right-sided weakness/dysarthria/expressive aphasia/facial droop secondary to above  Poorly-controlled HTN admitted with hypertensive emergency-improved  Labile BP with hypotension on 06/17-improved  Positive bubble study on TTE with negative ZEYAD  HLD   History of carotid artery stenosis status post right-sided carotid stent placement   Former tobacco use  Alcohol abuse  Medical noncompliance  Prophylaxis    Patient is on low-dose aspirin, statin as  recommended by neurology   Ultrasound carotid with less than 50% stenosis bilaterally   Bowel study was positive on TTE, manolo is negative  BP stable now on amlodipine 10 mg daily and labetalol 200 mg b.i.d.  Continue to closely monitor  Continue aggressive PT/OT/ST  Better tolerating p.o. diet  Continue multivitamin, thiamine, folic acid  DVT prophylaxis-Lovenox     Accepted to inpatient rehab   Awaiting bed availability           Lilia Woods MD   06/20/2023

## 2023-06-21 PROCEDURE — 63600175 PHARM REV CODE 636 W HCPCS: Performed by: NURSE PRACTITIONER

## 2023-06-21 PROCEDURE — 92507 TX SP LANG VOICE COMM INDIV: CPT

## 2023-06-21 PROCEDURE — 21400001 HC TELEMETRY ROOM

## 2023-06-21 PROCEDURE — 25000003 PHARM REV CODE 250: Performed by: INTERNAL MEDICINE

## 2023-06-21 PROCEDURE — 97110 THERAPEUTIC EXERCISES: CPT

## 2023-06-21 PROCEDURE — 25000003 PHARM REV CODE 250: Performed by: STUDENT IN AN ORGANIZED HEALTH CARE EDUCATION/TRAINING PROGRAM

## 2023-06-21 PROCEDURE — 97116 GAIT TRAINING THERAPY: CPT | Mod: CQ

## 2023-06-21 RX ADMIN — ENOXAPARIN SODIUM 40 MG: 40 INJECTION SUBCUTANEOUS at 07:06

## 2023-06-21 RX ADMIN — ASPIRIN 81 MG CHEWABLE TABLET 81 MG: 81 TABLET CHEWABLE at 09:06

## 2023-06-21 RX ADMIN — LABETALOL HYDROCHLORIDE 200 MG: 200 TABLET, FILM COATED ORAL at 08:06

## 2023-06-21 RX ADMIN — AMLODIPINE BESYLATE 10 MG: 5 TABLET ORAL at 09:06

## 2023-06-21 RX ADMIN — FOLIC ACID 1 MG: 1 TABLET ORAL at 09:06

## 2023-06-21 RX ADMIN — ATORVASTATIN CALCIUM 40 MG: 40 TABLET, FILM COATED ORAL at 09:06

## 2023-06-21 RX ADMIN — THIAMINE HCL TAB 100 MG 100 MG: 100 TAB at 09:06

## 2023-06-21 RX ADMIN — LABETALOL HYDROCHLORIDE 200 MG: 200 TABLET, FILM COATED ORAL at 09:06

## 2023-06-21 NOTE — PT/OT/SLP PROGRESS
Speech Language Pathology Department  Therapy Progress Note    Patient Name:  Wallace Lowry   MRN:  [id number]  Admitting Diagnosis: acute ischemic L CVA    Recommendations:     General recommendations:  dysphagia therapy, speech/language therapy, and cognitive-linguistic therapy  Communication strategies:  go to room if call light pushed    Discharge recommendations:  rehabilitation facility   Barriers to safe discharge: severity of impairment    Subjective     Patient awake, alert, calm, and cooperative.    Pain/Comfort: Pain Rating 1: 0/10  Spiritual/Cultural/Alevism Beliefs/Practices that affect care: no  Respiratory Status: room air    Objective:     Speech intelligibility:  over articulation.  Intelligibility increased from 25% in unknown context to 50% with strategy use.  Pt with carry over by end of session.    Assessment:     Pt continues to present with cognitive communicative impairments impacting communication of basic wants and needs.  Skilled SLP services continue to be warranted to tx impairments.    Goals:     Multidisciplinary Problems       SLP Goals          Problem: SLP    Goal Priority Disciplines Outcome   SLP Goal     SLP Ongoing, Progressing   Description:   LTG: Tolerate least restrictive PO diet with no clinical signs/sx aspiration  STGs:  1.  Pt will tolerate 2oz of ice chips by spoon with no clinical signs/sx aspiration.   2.  Complete lingual, labial, base of tongue, and laryngeal strengthening exercises with minimal cues  3.  Tolerate thermal stimulation to the anterior faucial pillars with 100% effortful swallow responses and delay less than 2 seconds.      LTG: Pt will improve communicative effectiveness to have needs/wants met with less than 10% communicative breakdown.  STGs:  1. Pt will increase speech intelligibility to 90% at the phrase level with minimal cues.  2. Pt will increase speech intelligibility to 80% at the sentence level with minimal cues.  3. Pt will increase  speech intelligibility to 80% at the conversation level with minimal cues.  4. Pt will use word finding strategies in conversation with minimal cues.      LTG: Pt will increase cognitive linguistic skills to increase safety awareness and independence  STGs:  1. Pt will recall 3-4 items with 80% accuracy following 5 minute filled delay with minimal cues.                     Patient Education:     Patient provided with verbal education regarding POC.  Understanding was verbalized, however additional teaching warranted.    Plan:     Will continue to follow and tx as appropriate.    SLP Follow-Up:  Yes   Patient to be seen:  5 x/week   Plan of Care expires:  06/29/23  Plan of Care reviewed with:  patient     Time Tracking:     SLP Treatment Date:   06/21/23  Speech Start Time:  1240  Speech Stop Time:  1255     Speech Total Time (min):  15 min    Billable minutes:  Speech/Language Therapy, 15 minutes     06/21/2023

## 2023-06-21 NOTE — PROGRESS NOTES
[facility]  Hospital Medicine Progress Note        Chief Complaint: Inpatient Follow-up CVA    HPI:   67-year-old male with significant history of HTN, HLD, prior CVA with unknown residual deficit, carotid artery stenosis status post right-sided carotid stent, former smoker quit 9 months back, alcohol abuse presented to the hospital as a transfer from outlying facility for acute CVA.  Patient went to bed normal on 06/12 and woke up on 06/13 with acute right-sided weakness, facial droop, slurry speech.  Patient was admitted to our ICU on 06/14.  Patient was severely hypertensive with systolic in 200s.  CT head was negative.  Initiated Cardene drip.  Neurology services consulted.  Stroke workup ordered.  MRI brain confirmed left internal capsule posterior limb acute infarct.  CT angiogram with no hemodynamically significant stenosis in internal carotid, right ICA 50-60% stenosis.  Echocardiogram pending paid neurology recommended aspirin, statin.  Weaned off Cardene drip and downgraded to hospitalist medicine service on 06/15.  Neurology recommending low-dose aspirin, statin.  Will need tighter control of blood pressure. initiated scheduled amlodipine, labetalol.  CT angiogram showed 50-60% stenosis on right ICA, patient has had previous stenting in the past.  Echocardiogram pending.  Therapy Services following. cleared for modified diet.  Still having swallowing difficulty as of 6/16 and therefore initiated Clinimix.  Labetalol increased to 300 mg b.i.d. for better control of blood pressure.  Bubble study positive and therefore cardiology consulted for OZZIE.  Plan for 6/19.  Patient hypotensive on 06/17.  Treated with IV fluid bolus with improvement.  Antihypertensives adjusted, dose lowered.  BP stable since 06/18.  Ozzie scheduled for 6/19.  Ozzie negative, no PFO.     Interval Hx:   No acute events reported overnight.  He was comfortably sitting in the couch.  Reported no complaints blood  pressure better controlled      Objective/physical exam:  General: In no acute distress, afebrile  Chest: Clear to auscultation bilaterally  Heart: S1, S2  Abdomen: Soft, nontender, BS +  MSK: Warm, no lower extremity edema  Neurologic: alert, awake, oriented.  expressive aphasia, dysarthria, right-sided weakness.     VITAL SIGNS: 24 HRS MIN & MAX LAST   Temp  Min: 97.5 °F (36.4 °C)  Max: 99.6 °F (37.6 °C) 98.5 °F (36.9 °C)   BP  Min: 112/66  Max: 163/98 130/85   Pulse  Min: 76  Max: 92  92   Resp  Min: 17  Max: 17 17   SpO2  Min: 95 %  Max: 98 % 96 %       Recent Labs   Lab 06/18/23  0109   WBC 7.46   RBC 4.38*   HGB 13.6*   HCT 39.7*   MCV 90.6   MCH 31.1*   MCHC 34.3   RDW 13.5      MPV 11.1*         Recent Labs   Lab 06/18/23  0109 06/19/23  0107    139   K 3.9 4.1   CO2 19* 22*   BUN 14.9 18.1   CREATININE 0.87 0.82   CALCIUM 8.3* 8.5*   ALBUMIN 3.1*  --    ALKPHOS 64  --    ALT 20  --    AST 32  --    BILITOT 0.7  --           Microbiology Results (last 7 days)       ** No results found for the last 168 hours. **             Scheduled Med:   amLODIPine  10 mg Oral Daily    aspirin  81 mg Oral Daily    atorvastatin  40 mg Oral Daily    enoxparin  40 mg Subcutaneous Q24H (prophylaxis, 1700)    folic acid  1 mg Oral Daily    labetaloL  200 mg Oral Q12H    multivitamin  1 tablet Oral Daily    thiamine  100 mg Oral Daily          Assessment/Plan:  Acute ischemic CVA-left internal capsule posterior limb infarct   Right-sided weakness/dysarthria/expressive aphasia/facial droop secondary to above  Poorly-controlled HTN admitted with hypertensive emergency-improved  Labile BP with hypotension on 06/17-improved  Positive bubble study on TTE with negative ZEYAD  HLD   History of carotid artery stenosis status post right-sided carotid stent placement   Former tobacco use  Alcohol abuse  Medical noncompliance    Continue current care  Continue aspirin 81, high-dose statin as recommended by Neurology   Continue  current antihypertensive regimen  amlodipine 10 mg daily and labetalol 200 mg b.i.d.  Continue to closely monitor  Continue aggressive PT/OT/ST  tolerating p.o. diet  Continue multivitamin, thiamine, folic acid  Case discussed with patient's nurse, case management,Accepted to inpatient rehab    Awaiting bed availability      DVT prophylaxis-Lovenox              Shelby Ramos MD   06/21/2023

## 2023-06-21 NOTE — PLAN OF CARE
[facility]. Awaiting bed availability

## 2023-06-21 NOTE — PT/OT/SLP PROGRESS
Occupational Therapy   Treatment    Name: Wallace Lowry  MRN: [id number]  Admitting Diagnosis:  acute ischemic L CVA    Recommendations:     Discharge Recommendations: rehabilitation facility  Discharge Equipment Recommendations:   (TBD by next level of care.)  Barriers to discharge:       Assessment:     Wallace Lowry is a 67 y.o. male with a medical diagnosis of acute ischemic L CVA. Performance deficits affecting function are weakness, impaired endurance, impaired self care skills, impaired functional mobility, gait instability, impaired balance, decreased coordination, decreased upper extremity function, decreased lower extremity function, impaired coordination, impaired fine motor. Pt is making good progress towards goals. Pt was provided with a give-prabha brace for RUE to prevent injury. Pt would benefit from rehab placement upon d/c.     Rehab Prognosis:  Good; patient would benefit from acute skilled OT services to address these deficits and reach maximum level of function.       Plan:     Patient to be seen 6 x/week to address the above listed problems via self-care/home management, therapeutic activities, therapeutic exercises, neuromuscular re-education, cognitive retraining  Plan of Care Expires: 06/30/23  Plan of Care Reviewed with: patient    Subjective     Pain/Comfort:  Pain Rating 1: 0/10    Objective:     Communicated with: RN prior to session.  Patient found up in chair with telemetry upon OT entry to room.    General Precautions: Standard, fall    Orthopedic Precautions:N/A  Braces:  (give-prabha brace provided)  Respiratory Status: Room air  Vital Signs: Blood Pressure: 117/73  HR: 102     Occupational Performance:       Functional Mobility/Transfers:  Patient completed Sit <> Stand Transfer with contact guard assistance  with  hand-held assist   Patient completed Bed <> Chair Transfer using Step Transfer technique with minimum assistance with hand-held assist  Functional Mobility: Pt required  minimal tactile cues to RLE to facilitate stepping movement with hand held assist.      Therapeutic Exercise:  Pt completed 5 reps of gravity eliminated elbow flexion/extension, shoulder flexion/extension, and towel crunches w RUE.   R Elbow: Pt able to complete 30-45 degrees of gravity eliminated elbow flexion. (5 reps)  R Shoulder: Pt able to complete 20 degrees of gravity eliminated shoulder flexion. (5 reps)  R hand: Pt able to complete towel crunches by flexing fingers to a 1/6 composite fist. 5 reps    Therapeutic Positioning    OT interventions performed during the course of today's session in an effort to prevent and/or reduce acquired pressure injuries:   Therapeutic positioning completed     Skin assessment: all bony prominences were assessed    Findings: no redness or breakdown noted      Patient Education:  Patient provided with verbal education regarding OT role/goals/POC, post op precautions, fall prevention, safety awareness, and Discharge/DME recommendations.  Understanding was verbalized.      Patient left up in chair with all lines intact and call button in reach    GOALS:   Multidisciplinary Problems       Occupational Therapy Goals          Problem: Occupational Therapy    Goal Priority Disciplines Outcome Interventions   Occupational Therapy Goal     OT, PT/OT Ongoing, Progressing    Description: LTG: Pt will perform basic ADLs and ADL transfers with SBA using LRAD by discharge.    STG: to be met in 2 weeks    Pt will complete grooming standing at sink with LRAD with min a.  Pt will complete UB dressing with Min A.  Pt will complete LB dressing with Min A using LRAD.  Pt will complete toileting with min A using LRAD.  Pt will complete toilet transfer with min A using BSC and  LRAD.   Pt will demo increased strength in RUE to 3-/5 MMT for increased functional use during ADL tasks.                       Time Tracking:     OT Date of Treatment: 06/21/23  OT Start Time: 1121  OT Stop Time: 1145  OT  Total Time (min): 24 min    Billable Minutes:Therapeutic Exercise 2 units    OT/SCOTTIE: OT     Number of SCOTTIE visits since last OT visit: 1    6/21/2023

## 2023-06-21 NOTE — PT/OT/SLP PROGRESS
Physical Therapy Treatment    Patient Name:  Wallace Lowry   MRN:  [id number]    Recommendations:     Discharge Recommendations: rehabilitation facility  Discharge Equipment Recommendations: to be determined by next level of care  Barriers to discharge: Impaired mobility and Ongoing medical needs    Assessment:     Wallace Lowry is a 67 y.o. male admitted with a medical diagnosis of Stroke.  He presents with the following impairments/functional limitations: weakness, impaired endurance, impaired balance, decreased safety awareness, impaired cardiopulmonary response to activity, impaired self care skills, impaired functional mobility, gait instability, decreased lower extremity function .    Rehab Prognosis: Good; patient would benefit from acute skilled PT services to address these deficits and reach maximum level of function.    Recent Surgery: * No surgery found *      Plan:     During this hospitalization, patient to be seen 6 x/week to address the identified rehab impairments via gait training, therapeutic activities and progress toward the following goals:    Plan of Care Expires:  07/16/23    Subjective     Chief Complaint:   Patient/Family Comments/goals:   Pain/Comfort:         Objective:     Communicated with nurse prior to session.  Patient found HOB elevated with peripheral IV, pulse ox (continuous), telemetry upon PT entry to room.     General Precautions: Standard, aphasia (BP <140/90)  Orthopedic Precautions: N/A  Braces: N/A  Respiratory Status: Room air  Blood Pressure: 128/88  Skin Integrity: Visible skin intact      Functional Mobility:  Bed Mobility:     Rolling Left:  contact guard assistance  Rolling Right: moderate assistance  Supine to Sit: moderate assistance  Transfers:     Sit to Stand:  moderate assistance with hemiwalker  Bed to Chair: moderate assistance with  hemiwalker  using  Stand Pivot  Gait: pt performed 2 trials of gait; 1st trial with L hand rail x15ft with modA with some  assistance for R foot placement and R knee blocked into extension as well as cues for upright posture; 2nd trial pt amb x12ft with RW and modA with R knee blocked into extension during stance phase, noted increased R hip flexion        Education:  Patient provided with verbal education regarding safety awareness.  Understanding was verbalized, however additional teaching warranted.     Patient left up in chair with all lines intact and call button in reach..    GOALS:   Multidisciplinary Problems       Physical Therapy Goals          Problem: Physical Therapy    Goal Priority Disciplines Outcome Goal Variances Interventions   Physical Therapy Goal     PT, PT/OT Ongoing, Progressing     Description: Goals to be met by: 23     Patient will increase functional independence with mobility by performin. Supine to sit with MInimal Assistance  2. Sit to supine with MInimal Assistance  3. Sit to stand transfer with Minimal Assistance  4. Bed to chair transfer with Minimal Assistance using LRAD  5. Gait  x 150 feet with Minimal Assistance using LRAD.                          Time Tracking:     PT Received On: 23  PT Start Time: 828     PT Stop Time: 844  PT Total Time (min): 16 min     Billable Minutes: Gait Training 16    Treatment Type: Treatment  PT/PTA: PTA     Number of PTA visits since last PT visit: 3     2023

## 2023-06-22 VITALS
RESPIRATION RATE: 20 BRPM | DIASTOLIC BLOOD PRESSURE: 85 MMHG | TEMPERATURE: 98 F | OXYGEN SATURATION: 97 % | SYSTOLIC BLOOD PRESSURE: 133 MMHG | HEART RATE: 85 BPM | WEIGHT: 117.94 LBS | BODY MASS INDEX: 20.14 KG/M2 | HEIGHT: 64 IN

## 2023-06-22 PROCEDURE — 25000003 PHARM REV CODE 250: Performed by: STUDENT IN AN ORGANIZED HEALTH CARE EDUCATION/TRAINING PROGRAM

## 2023-06-22 PROCEDURE — 25000003 PHARM REV CODE 250: Performed by: INTERNAL MEDICINE

## 2023-06-22 RX ADMIN — THIAMINE HCL TAB 100 MG 100 MG: 100 TAB at 09:06

## 2023-06-22 RX ADMIN — FOLIC ACID 1 MG: 1 TABLET ORAL at 09:06

## 2023-06-22 RX ADMIN — AMLODIPINE BESYLATE 10 MG: 5 TABLET ORAL at 09:06

## 2023-06-22 RX ADMIN — THERA TABS 1 TABLET: TAB at 09:06

## 2023-06-22 RX ADMIN — ATORVASTATIN CALCIUM 40 MG: 40 TABLET, FILM COATED ORAL at 09:06

## 2023-06-22 RX ADMIN — ASPIRIN 81 MG CHEWABLE TABLET 81 MG: 81 TABLET CHEWABLE at 09:06

## 2023-06-22 RX ADMIN — LABETALOL HYDROCHLORIDE 200 MG: 200 TABLET, FILM COATED ORAL at 09:06

## 2023-06-22 NOTE — PLAN OF CARE
06/22/23 0848   Final Note   Assessment Type Final Discharge Note   Anticipated Discharge Disposition Rehab   Post-Acute Status   Post-Acute Authorization Placement   Post-Acute Placement Status Set-up Complete/Auth obtained  ([facility])     Transport via [place] at 11. Notified Fam, patient's brother, regarding transfer plans.

## 2023-06-22 NOTE — DISCHARGE SUMMARY
[facility] - 9th Floor Med Surg  [facility] MEDICINE - DISCHARGE SUMMARY    Patient Name: Wallace Lowry  MRN: [id number]  Admission Date: 6/14/2023  Discharge Date: 06/22/2023  Hospital Length of Stay: 8 days  Discharge Provider: Surya Rowley MD  Primary Care Provider: Primary Doctor No      HOSPITAL COURSE   67-year-old male with significant history of HTN, HLD, prior CVA with unknown residual deficit, carotid artery stenosis status post right-sided carotid stent, former smoker quit 9 months back, alcohol abuse presented to the hospital as a transfer from outlying facility for acute CVA.  Patient went to bed normal on 06/12 and woke up on 06/13 with acute right-sided weakness, facial droop, slurry speech.  Patient was admitted to our ICU on 06/14.  Patient was severely hypertensive with systolic in 200s.  CT head was negative.  Initiated Cardene drip.  Neurology services consulted.  Stroke workup ordered.  MRI brain confirmed left internal capsule posterior limb acute infarct.  CT angiogram with no hemodynamically significant stenosis in internal carotid, right ICA 50-60% stenosis.  Echocardiogram pending paid neurology recommended aspirin, statin.  Weaned off Cardene drip and downgraded to hospitalist medicine service on 06/15.  Neurology recommending low-dose aspirin, statin.  Will need tighter control of blood pressure. initiated scheduled amlodipine, labetalol.  CT angiogram showed 50-60% stenosis on right ICA, patient has had previous stenting in the past.  Echocardiogram pending.  Therapy Services following. cleared for modified diet.  Still having swallowing difficulty as of 6/16 and therefore initiated Clinimix.  Labetalol increased to 300 mg b.i.d. for better control of blood pressure.  Bubble study positive and therefore cardiology consulted for ZEYAD.  Plan for 6/19.  Patient hypotensive on 06/17.  Treated with IV fluid bolus with improvement.  Antihypertensives adjusted, dose lowered.  BP  stable since 06/18.  Ozzie scheduled for 6/19.  Ozzie negative, no PFO.  Patient was cleared for diet by speech therapist.  At this time patient has been placed to rehab facility.  Secondary stroke prevention is aspirin 81 mg, statin, blood pressure control.        PHYSICAL EXAM     Most Recent Vital Signs:  Temp: 98 °F (36.7 °C) (06/22/23 0721)  Pulse: 85 (06/22/23 0721)  Resp: 20 (06/22/23 0721)  BP: 133/85 (06/22/23 0721)  SpO2: 97 % (06/22/23 0721)   GENERAL: In no acute distress, afebrile  HEENT:  CHEST: Clear to auscultation bilaterally  HEART: S1, S2, no appreciable murmur  ABDOMEN: Soft, nontender, BS +  MSK: Warm, no lower extremity edema, no clubbing or cyanosis  NEUROLOGIC: Alert and oriented x4, significant expressive aphasia, right-sided weakness 1/5  INTEGUMENTARY:  PSYCHIATRY:          DISCHARGE DIAGNOSIS   Acute ischemic CVA-left internal capsule posterior limb infarct   Right-sided weakness/dysarthria/expressive aphasia/facial droop secondary to above  Poorly-controlled HTN admitted with hypertensive emergency-improved  HLD   History of carotid artery stenosis status post right-sided carotid stent placement   Former tobacco use  Alcohol abuse  Medical noncompliance        _____________________________________________________________________________      DISCHARGE MED REC     Current Discharge Medication List        START taking these medications    Details   aspirin 81 MG Chew Take 1 tablet (81 mg total) by mouth once daily.  Refills: 0      folic acid (FOLVITE) 1 MG tablet Take 1 tablet (1 mg total) by mouth once daily.  Refills: 0      labetaloL (NORMODYNE) 200 MG tablet Take 1 tablet (200 mg total) by mouth every 12 (twelve) hours. Please adjust doses based on BP  Qty: 60 tablet, Refills: 0    Comments: .      multivitamin Tab Take 1 tablet by mouth once daily.           CONTINUE these medications which have NOT CHANGED    Details   amLODIPine (NORVASC) 10 MG tablet Take 10 mg by mouth nightly.       atorvastatin (LIPITOR) 80 MG tablet Take 80 mg by mouth once daily.           STOP taking these medications       clopidogreL (PLAVIX) 75 mg tablet Comments:   Reason for Stopping:         hydroCHLOROthiazide (HYDRODIURIL) 25 MG tablet Comments:   Reason for Stopping:         metoprolol succinate (TOPROL-XL) 100 MG 24 hr tablet Comments:   Reason for Stopping:                  CONSULTS     Consults (From admission, onward)          Status Ordering Provider     Inpatient consult to Social Work/Case Management  Once        Provider:  (Not yet assigned)    Completed KAREY PHILLIPS     Inpatient consult to Cardiology  Once        Provider:  Danie Ledezma MD    Completed KAREY PHILLIPS     Inpatient consult to Neurology  Once        Provider:  Nicol Lackey MD    Completed MALCOLM ORTIZ              FOLLOW UP      Follow-up Information       Adrian Wise MD. Schedule an appointment as soon as possible for a visit.    Specialties: Neurology, Interventional Neurology  Why: Appointment is scheduled for July 18th at 9:30a.m.  Contact information:  [address] [place] [id number]  [phone]                                 DISCHARGE INSTRUCTIONS     Explained in detail to the patient about the discharge plan, medications, and follow-up visits. Pt understands and agrees with the treatment plan.  Discharged Condition: stable  Diet as tolerated  Activities as tolerated  Discharge to: Rehab Facility    TIME SPENT ON DISCHARGE   35 minutes        Surya Rowley MD  Internal Medicine  Department of Hospital Medicine [facility] - 9th Floor Med Surg      This document was created using electronic dictation services.  Please excuse any errors that may have been made.  Contact me if any questions regarding documentation to clarify verbiage.

## 2023-07-10 ENCOUNTER — HOSPITAL ENCOUNTER (OUTPATIENT)
Dept: RADIOLOGY | Facility: HOSPITAL | Age: 68
Discharge: HOME | End: 2023-07-10
Attending: PHYSICAL MEDICINE & REHABILITATION
Payer: MEDICARE

## 2023-07-10 DIAGNOSIS — Z00.8 ENCOUNTER FOR MEDICAL CLEARANCE FOR PATIENT HOLD: ICD-10-CM

## 2023-07-10 DIAGNOSIS — R13.10 DIFFICULTY SWALLOWING: ICD-10-CM

## 2023-07-10 DIAGNOSIS — R13.12 OROPHARYNGEAL DYSPHAGIA: Primary | ICD-10-CM

## 2023-07-10 PROCEDURE — 71046 X-RAY EXAM CHEST 2 VIEWS: CPT | Mod: TC

## 2023-07-10 PROCEDURE — 74230 X-RAY XM SWLNG FUNCJ C+: CPT | Mod: TC

## 2023-07-10 PROCEDURE — 92611 MOTION FLUOROSCOPY/SWALLOW: CPT

## 2023-07-10 NOTE — PROGRESS NOTES
Modified Barium Swallow    Patient Name:  Wallace Lowry   MRN:  [id number]     07/10/23 1001   (RETIRED) Treatment Time/Intention   Document Type evaluation   Mode of Treatment individual therapy   (RETIRED) General Information   Referring Physician [facility]/Dr. Vora   Patient/Family/Caregiver Comments/Observations Pt had CVA ~3 months ago. MBSS in ICU that recommended TL, but has been on nectar (in [facility]) due to Pt fear and inconsistent overt s/s of aspiration.   Pertinent History of Current Functional Problem [facility] ST came to MBSS. Pt exhibited Dysarthria, was compliant, but ST provided Hx.         Recommendations:     Recommendations:                General Recommendations:  Dysphagia therapy  Diet recommendations:   Minced and moist, TL     Aspiration Precautions: 1 bite/sip at a time, Alternating bites/sips, Avoid talking while eating, Check for pocketing/oral residue, Double swallow with each bite/sip, Eliminate distractions, Frequent oral care, Meds crushed in puree, Remain upright 30 minutes post meal, Small bites/sips, and Standard aspiration precautions   General Precautions: Standard,    Communication strategies:   Decreasing environmental noises when Pt is speaking    Referral     Reason for Referral  Patient was referred for a Modified Barium Swallow Study to assess the efficiency of his/her swallow function, rule out aspiration and make recommendations regarding safe dietary consistencies, effective compensatory strategies, and safe eating environment.     Diagnosis: <principal problem not specified>       History:     Past Medical History:   Diagnosis Date    Hypertension     Stroke        Objective:     Current Respiratory Status:   Room air    Alert: yes    Cooperative: yes    Follows Directions: yes    Prior Diet: Mechanical soft and Nectar thick liquids    Visualization  Patient was seen in the lateral view    Oral Peripheral Examination   Scattered dentition  General oral  weakness    Consistencies Assessed  Thin liquids spoon, cup, and straw  Puree applesauce  Soft solids eggs  Solids hash brown  Chopped meat villarreal    Oral Phase:   Prolonged/disorganized bolus formation  Disorganized lingual movement  Poor anterior-posterior transport  Inadequate mastication  Piecemeal deglutition  Tongue pumping  Poor bolus cohesion  Loss of bolus control   Expectoration of meat due to decreased ability to masticate    Pharyngeal Phase:   Swallow delay with spill to the valleculae and pyriforms  Reduced base of tongue retraction  Reduced hyolaryngeal excursion  Adequate airway protection  Laryngeal penetration x1 on TL straw  Reduced UES opening  Meat not visualized during swallow b/c Pt had to expectorate bolus due to inadequate mastication.   ST cued Pt to perform a double swallow to clear solids. Pt was not able to do an immediate double swallow upon verbal command, but when he completed a double swallow it cleared solids from pharynx. Liquid sip also cleared pharyngeal residue.     Consistency Laryngeal Penetration Aspiration Residue   Thin liquid by spoon None None None   Thin liquid by cup None None None   Thin liquid by straw During the swallow  Cleared   Only noted x1 and minimal penetration None None   Puree None None None   Eggs  None None Mild  Base of tongue, Valleculae, and Pyriform sinus   Hashbrowns None None Mild  Base of tongue, Valleculae, and Pyriform sinus     ST noted some liquid passing UES, into esophagus, prior to swallow trigger.  Slowing of bolus noted in cervical esophagus.     Assessment:     Impressions   Patient demonstrates mild Oropharyngeal  dysphagia characterized by description above.   ST recommends a minced and moist diet to aid in mastication.  ST recommends small cup sips of TL.  ST recommends use of strategies mentioned above (specifically small sips and double swallow and/or liquid wash)   ST recommend Pt is monitored during PO intake.     Activity  Capabilities:  alertness, comprehension, and participation    Activity Limitations: poor dentition, absent dentition, pharyngeal residue, and laryngeal penetration    Prognosis: Good    Plan  Cont Dysphagia tx in rehab hospital.    Education  ST discussed MBSS w/Pt and primary ST.          Time Tracking:        Billable Minutes:  Motion Fluoro Swallow, Cine/Vid 60    07/10/2023

## 2023-09-18 PROBLEM — I63.9 STROKE: Status: RESOLVED | Noted: 2023-06-15 | Resolved: 2023-09-18

## 2023-12-11 ENCOUNTER — HOSPITAL ENCOUNTER (EMERGENCY)
Facility: HOSPITAL | Age: 68
Discharge: HOME | End: 2023-12-11
Attending: FAMILY MEDICINE
Payer: MEDICARE

## 2023-12-11 VITALS
TEMPERATURE: 98 F | SYSTOLIC BLOOD PRESSURE: 124 MMHG | WEIGHT: 115 LBS | OXYGEN SATURATION: 98 % | BODY MASS INDEX: 19.63 KG/M2 | DIASTOLIC BLOOD PRESSURE: 78 MMHG | RESPIRATION RATE: 18 BRPM | HEART RATE: 82 BPM | HEIGHT: 64 IN

## 2023-12-11 DIAGNOSIS — R53.1 WEAKNESS: Primary | ICD-10-CM

## 2023-12-11 DIAGNOSIS — R53.1 WEAK: ICD-10-CM

## 2023-12-11 LAB
ALBUMIN SERPL-MCNC: 4.5 G/DL (ref 3.4–5)
ALBUMIN/GLOB SERPL: 1.5 RATIO
ALP SERPL-CCNC: 93 UNIT/L (ref 50–144)
ALT SERPL-CCNC: 38 UNIT/L (ref 1–45)
ANION GAP SERPL CALC-SCNC: 11 MEQ/L (ref 2–13)
APPEARANCE UR: CLEAR
AST SERPL-CCNC: 36 UNIT/L (ref 17–59)
BASOPHILS # BLD AUTO: 0.06 X10(3)/MCL (ref 0.01–0.08)
BASOPHILS NFR BLD AUTO: 0.7 % (ref 0.1–1.2)
BILIRUB SERPL-MCNC: 0.5 MG/DL (ref 0–1)
BILIRUB UR QL STRIP.AUTO: ABNORMAL
BUN SERPL-MCNC: 23 MG/DL (ref 7–20)
CALCIUM SERPL-MCNC: 9.2 MG/DL (ref 8.4–10.2)
CHLORIDE SERPL-SCNC: 103 MMOL/L (ref 98–110)
CK MB SERPL-MCNC: 1.06 NG/ML (ref 0–3.38)
CK SERPL-CCNC: 103 U/L (ref 55–170)
CO2 SERPL-SCNC: 25 MMOL/L (ref 21–32)
COLOR UR AUTO: YELLOW
CREAT SERPL-MCNC: 1.07 MG/DL (ref 0.66–1.25)
CREAT/UREA NIT SERPL: 21 (ref 12–20)
EOSINOPHIL # BLD AUTO: 0.2 X10(3)/MCL (ref 0.04–0.54)
EOSINOPHIL NFR BLD AUTO: 2.4 % (ref 0.7–7)
ERYTHROCYTE [DISTWIDTH] IN BLOOD BY AUTOMATED COUNT: 12.8 %
GFR SERPLBLD CREATININE-BSD FMLA CKD-EPI: 77 MLS/MIN/1.73/M2
GLOBULIN SER-MCNC: 3.1 GM/DL (ref 2–3.9)
GLUCOSE SERPL-MCNC: 111 MG/DL (ref 70–115)
GLUCOSE UR QL STRIP.AUTO: NEGATIVE
HCT VFR BLD AUTO: 36.9 % (ref 36–52)
HGB BLD-MCNC: 13.1 G/DL (ref 13–18)
IMM GRANULOCYTES # BLD AUTO: 0.04 X10(3)/MCL (ref 0–0.03)
IMM GRANULOCYTES NFR BLD AUTO: 0.5 % (ref 0–0.5)
KETONES UR QL STRIP.AUTO: NEGATIVE
LEUKOCYTE ESTERASE UR QL STRIP.AUTO: NEGATIVE
LYMPHOCYTES # BLD AUTO: 1.46 X10(3)/MCL (ref 1.32–3.57)
LYMPHOCYTES NFR BLD AUTO: 17.7 % (ref 20–55)
MAGNESIUM SERPL-MCNC: 2.1 MG/DL (ref 1.8–2.4)
MCH RBC QN AUTO: 31.6 PG (ref 27–34)
MCHC RBC AUTO-ENTMCNC: 35.5 G/DL (ref 31–37)
MCV RBC AUTO: 88.9 FL (ref 79–99)
MONOCYTES # BLD AUTO: 0.72 X10(3)/MCL (ref 0.3–0.82)
MONOCYTES NFR BLD AUTO: 8.7 % (ref 4.7–12.5)
NEUTROPHILS # BLD AUTO: 5.79 X10(3)/MCL (ref 1.78–5.38)
NEUTROPHILS NFR BLD AUTO: 70 % (ref 37–73)
NITRITE UR QL STRIP.AUTO: NEGATIVE
NRBC BLD AUTO-RTO: 0 %
PH UR STRIP.AUTO: 5.5 [PH]
PLATELET # BLD AUTO: 237 X10(3)/MCL (ref 140–371)
PMV BLD AUTO: 10.6 FL (ref 9.4–12.4)
POTASSIUM SERPL-SCNC: 4.1 MMOL/L (ref 3.5–5.1)
PROT SERPL-MCNC: 7.6 GM/DL (ref 6.3–8.2)
PROT UR QL STRIP.AUTO: NEGATIVE
RBC # BLD AUTO: 4.15 X10(6)/MCL (ref 4–6)
RBC UR QL AUTO: NEGATIVE
SODIUM SERPL-SCNC: 139 MMOL/L (ref 135–145)
SP GR UR STRIP.AUTO: >=1.03 (ref 1–1.03)
TROPONIN I SERPL-MCNC: <0.012 NG/ML (ref 0–0.03)
UROBILINOGEN UR STRIP-ACNC: 0.2
WBC # SPEC AUTO: 8.27 X10(3)/MCL (ref 4–11.5)

## 2023-12-11 PROCEDURE — 82550 ASSAY OF CK (CPK): CPT | Performed by: FAMILY MEDICINE

## 2023-12-11 PROCEDURE — 80053 COMPREHEN METABOLIC PANEL: CPT | Performed by: FAMILY MEDICINE

## 2023-12-11 PROCEDURE — 99285 EMERGENCY DEPT VISIT HI MDM: CPT | Mod: 25

## 2023-12-11 PROCEDURE — 81003 URINALYSIS AUTO W/O SCOPE: CPT | Performed by: FAMILY MEDICINE

## 2023-12-11 PROCEDURE — 85025 COMPLETE CBC W/AUTO DIFF WBC: CPT | Performed by: FAMILY MEDICINE

## 2023-12-11 PROCEDURE — 83735 ASSAY OF MAGNESIUM: CPT | Performed by: FAMILY MEDICINE

## 2023-12-11 PROCEDURE — 82553 CREATINE MB FRACTION: CPT | Performed by: FAMILY MEDICINE

## 2023-12-11 PROCEDURE — 93010 ELECTROCARDIOGRAM REPORT: CPT | Mod: ,,, | Performed by: INTERNAL MEDICINE

## 2023-12-11 PROCEDURE — 93010 EKG 12-LEAD: ICD-10-PCS | Mod: ,,, | Performed by: INTERNAL MEDICINE

## 2023-12-11 PROCEDURE — 93005 ELECTROCARDIOGRAM TRACING: CPT

## 2023-12-11 PROCEDURE — 84484 ASSAY OF TROPONIN QUANT: CPT | Performed by: FAMILY MEDICINE

## 2023-12-11 NOTE — ED PROVIDER NOTES
Encounter Date: 12/11/2023       History     Chief Complaint   Patient presents with    Weakness     Pt reports onset of weakness while attempting to get out of recliner approx 1600. Denies any dizziness, headache, or N/V. Brother reports pt was pale and clammy on arrival, but color has improved. Denies any change in mentation. 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 Brother reports hx of multiple strokes.     Patient presented to the emergency room brought by family. They found him an hour ago weak and unable to get out of his recliner.  He had trouble speaking.  Patient said he was too weak to talk.  Family said he was kind of grunting and a little disoriented, diaphoretic.  He has a residual right-sided weakness from a previous stroke.  On the way to the hospital, patient recovered fully and is normal for him (slight dysarthria, no aphasia, right hand weakness but full-strength of bilateral lower extremities).  Family agrees that he is at his baseline right now.    The history is provided by the patient and a relative.     Review of patient's allergies indicates:   Allergen Reactions    Shellfish derived      Past Medical History:   Diagnosis Date    Hypertension     Stroke      History reviewed. No pertinent surgical history.  History reviewed. No pertinent family history.  Social History     Tobacco Use    Smoking status: Former     Types: Cigarettes    Smokeless tobacco: Never   Substance Use Topics    Alcohol use: Not Currently    Drug use: Not Currently     Review of Systems   Constitutional:  Positive for fatigue. Negative for fever.   HENT:  Negative for sore throat.    Respiratory:  Negative for shortness of breath.    Cardiovascular:  Negative for chest pain and palpitations.   Gastrointestinal:  Negative for nausea.   Genitourinary:  Negative for dysuria.   Musculoskeletal:  Negative for back pain.   Skin:  Negative for rash.   Neurological:  Positive for speech difficulty. Negative for weakness and  headaches.   Hematological:  Does not bruise/bleed easily.   All other systems reviewed and are negative.      Physical Exam     Initial Vitals [12/11/23 1624]   BP Pulse Resp Temp SpO2   132/78 85 18 97.9 °F (36.6 °C) 99 %      MAP       --         Physical Exam    Nursing note and vitals reviewed.  Constitutional: Vital signs are normal. He appears well-developed and well-nourished. He is cooperative.  Non-toxic appearance. He does not appear ill.   HENT:   Head: Normocephalic and atraumatic.   Eyes: Conjunctivae and lids are normal.   Neck: Trachea normal. Neck supple.   Cardiovascular:  Normal rate and regular rhythm.  No extrasystoles are present.          Pulmonary/Chest: Breath sounds normal.   Abdominal: Abdomen is soft. There is no abdominal tenderness.   Musculoskeletal:      Cervical back: Neck supple.      Comments: Right upper extremity mild contractures, but able to move basic functions with his arm,   Left upper extremity, bilateral LE full rom, str 5/5, DTR 2+    (Patient and family note this is at his baseline)     Neurological: He is alert and oriented to person, place, and time. He has normal strength. No cranial nerve deficit or sensory deficit. He displays a negative Romberg sign.   Skin: Skin is warm, dry and intact. Capillary refill takes less than 2 seconds.   Psychiatric: He has a normal mood and affect. His speech is normal and behavior is normal. He is not actively hallucinating. He is attentive.         ED Course   Procedures  Labs Reviewed   COMPREHENSIVE METABOLIC PANEL - Abnormal; Notable for the following components:       Result Value    Blood Urea Nitrogen 23.0 (*)     BUN/Creatinine Ratio 21 (*)     All other components within normal limits   URINALYSIS - Abnormal; Notable for the following components:    Bilirubin, UA Small (*)     All other components within normal limits    Narrative:      URINE STABILITY IS 2 HOURS AT ROOM TEMP OR    SIX HOURS REFRIGERATED. PERFORMING TESTING  ON    SPECIMENS GREATER THAN THIS AGE MAY AFFECT THE    FOLLOWING TESTS:    PH          SPECIFIC GRAVITY           BLOOD    CLARITY     BILIRUBIN               UROBILINOGEN   CBC WITH DIFFERENTIAL - Abnormal; Notable for the following components:    Lymph % 17.7 (*)     Neut # 5.79 (*)     IG# 0.04 (*)     All other components within normal limits   TROPONIN I - Normal   CK - Normal   CK-MB - Normal   MAGNESIUM - Normal   CBC W/ AUTO DIFFERENTIAL    Narrative:     The following orders were created for panel order CBC Auto Differential.  Procedure                               Abnormality         Status                     ---------                               -----------         ------                     CBC with Differential[[id number]]       Abnormal            Final result                 Please view results for these tests on the individual orders.        ECG Results              EKG 12-lead (Preliminary result)  Result time 12/11/23 17:11:26      Wet Read by Ayad Marte MD (12/11/23 17:11:26, [facility], [facility])    Normal sinus rhythm, heart rate 93, there is a baseline artifact, normal P waves, normal QRS, normal ST segments, normal T-waves, normal QT.                                  Imaging Results              CT Head Without Contrast (Final result)  Result time 12/11/23 17:46:28      Final result by Thanh Herrera MD (12/11/23 17:46:28)                   Impression:    Impression:  1.  Supratentorial greater than infratentorial ventriculomegaly is similar in degree to the previous exam and may just be due to central involutional changes, but a communicating hydrocephalus, such as normal pressure hydrocephalus, cannot be excluded.  2.  Similar extensive supratentorial and pontine white matter microvascular ischemic changes.  If there is clinical concern for small foci of acute ischemia adjacent to these chronic changes, recommend an MRI of the brain.  3.   Similar appearance of multiple chronic lacunar infarcts in the bilateral supratentorial brain.      Electronically signed by: Thanh Herrera MD  Date:    12/11/2023  Time:    17:46               Narrative:      CT HEAD WITHOUT CONTRAST:    CPT 73351        Total DLP: 1044.4 mGy-cm. Automatic exposure control was utilized to limit the radiation dose to the patient.    HISTORY: Acute onset of focal neurologic deficits with dysphagia and weakness.      Comparison: MRI from 06/15/2023.  CT scan from 06/14/2023.      Technique: Multiple contiguous axial images were acquired from the base of the skull the vertex without contrast administration.    Findings:  There are diffuse cerebral and cerebellar parenchymal involutional changes with resultant similar prominence of the ventricles and basal cisterns.  Multiple chronic lacunar infarcts in the supratentorial brain are similar in appearance.  There is similar extensive asymmetric low density without obvious mass-effect throughout the bilateral supratentorial and pontine white matter. The gray-white junctions are maintained. No other cerebral or cerebellar parenchymal abnormality is identified. There is no hemorrhage, midline shift, significant mass-effect, or extra-axial fluid collection. There are calcifications of the distal internal carotid and vertebrobasilar arteries. The partially visualized paranasal sinuses and mastoid air cells are essentially clear.  The calvarium is intact.                                       Medications - No data to display  Medical Decision Making  Amount and/or Complexity of Data Reviewed  Labs: ordered.  Radiology: ordered. Decision-making details documented in ED Course.  Discussion of management or test interpretation with external provider(s): Patient was completely back to normal, as he was when he arrived.  He is complaints of generalized weakness and diaphoresis, are not consistent with a TIA.  It is unclear where this weakness came  from.  The ER workup is essentially negative.  He does have extensive ischemic changes in his brain.  He will be discharged home, and needs to follow up with his PCP tomorrow.    Critical Care  Total time providing critical care: 0 minutes               ED Course as of 12/11/23 1822   Mon Dec 11, 2023   1754 CT Head Without Contrast  Extensive chronic ischemic changes [TM]      ED Course User Index  [TM] Ayad Marte MD                           Clinical Impression:  Final diagnoses:  [R53.1] Weak  [R53.1] Weakness (Primary)          ED Disposition Condition    Discharge Good          ED Prescriptions    None       Follow-up Information       Follow up With Specialties Details Why Contact Info    [facility]-  Call in 1 day  [address] [address] [facility]  [place] [id number]  [phone]               Ayad Marte MD  12/11/23 8182

## 2024-10-21 ENCOUNTER — HOSPITAL ENCOUNTER (OUTPATIENT)
Dept: RADIOLOGY | Facility: HOSPITAL | Age: 69
Discharge: HOME | End: 2024-10-21
Attending: FAMILY MEDICINE
Payer: MEDICARE

## 2024-10-21 ENCOUNTER — CLINICAL SUPPORT (OUTPATIENT)
Facility: HOSPITAL | Age: 69
End: 2024-10-21
Attending: FAMILY MEDICINE
Payer: MEDICARE

## 2024-10-21 DIAGNOSIS — I69.951: ICD-10-CM

## 2024-10-21 DIAGNOSIS — R13.12 OROPHARYNGEAL DYSPHAGIA: Primary | ICD-10-CM

## 2024-10-21 DIAGNOSIS — R13.10 PROBLEMS WITH SWALLOWING AND MASTICATION: ICD-10-CM

## 2024-10-21 PROCEDURE — 25500020 PHARM REV CODE 255: Performed by: FAMILY MEDICINE

## 2024-10-21 PROCEDURE — 74230 X-RAY XM SWLNG FUNCJ C+: CPT | Mod: TC

## 2024-10-21 PROCEDURE — 70551 MRI BRAIN STEM W/O DYE: CPT | Mod: TC

## 2024-10-21 PROCEDURE — 92611 MOTION FLUOROSCOPY/SWALLOW: CPT

## 2024-10-21 PROCEDURE — A9698 NON-RAD CONTRAST MATERIALNOC: HCPCS | Performed by: FAMILY MEDICINE

## 2024-10-21 RX ADMIN — BARIUM SULFATE 60 ML: 980 POWDER, FOR SUSPENSION ORAL at 11:10

## 2024-10-21 NOTE — PROGRESS NOTES
[facility]  MODIFIED BARIUM SWALLOW STUDY  Outpatient    Date: 10/21/2024     Name: Wallace Lowry   MRN: [id number]    Therapy Diagnosis: Mild Oropharyngeal Dysphagia    Considerations for recommending MBSS/ results of CSE:   Patient was referred for a Modified Barium Swallow Study to assess the efficiency of his/her swallow function, rule out aspiration and make recommendations regarding safe dietary consistencies, effective compensatory strategies, and safe eating environment.     Procedure Min.   Swallow and Oral Function Tx       Fl Modified Barium Swallow Speech  30 min     Precautions: Fall and Aspiration      History and Interview      Date of Onset: Chronic Dysphagia secondary to hx of multiple CVAs    Past Medical History: Wallace Lowry  has a past medical history of Hypertension and Stroke.  Wallace Lowry  has no past surgical history on file.    The patient is a 68 y.o. male who complains of coughing and choking with intake. He was accompanied to today's study by his Sister-in-law, who assisted with providing information. Approximately 2 months ago, Pt began demonstrating some neuro changes in regards to his speech and gait. She reported around that time, his dysphagia has worsened. Pt reports consuming essentially a soft diet secondary to missing, scattered dentition. Right facial asymmetry and Dysarthria noted.      The following observations were made:   -Mental status: Alert, Distractible, and Cooperative  -Factors affecting performance: no difficulties participating in the study  -Feeding Method: needs some assistance    Respiratory Status:   -Respiratory Status: room air    Medical Hx and Allergies:    Review of patient's allergies indicates:   Allergen Reactions    Shellfish derived        Pain Scale:  0/10   Pain Location: No pain indicated    Objective     MODIFIED BARIUM SWALLOW STUDY:  A modified barium swallow study was ordered to objectively evaluate the safety  "and efficiency of the patients swallowing and to rule out aspiration.      The patient was seen in radiology seated in High Chun's position in standard chair for a lateral and anterior view of the swallow mechanism. . He tolerated the procedure well.     Consistencies administered and features assessed:  Blank cells represent efficient and effective features of the swallow.  Grey cells represent consistencies/trials not presented.        Thin liquid (IDDSI 0) -  5ml via spoon x4 Thin liquid (IDDSI 0) - "typical sip"  X3 Thin liquid (IDDSI 0) - consecutive cup sips x3sips Thin liquid (IDDSI 0)- bolus hold x1 Mildly thick (IDDSI 2)-   "Typical sip"  x2 Moderately thick (IDDSI 3) -   "Typical sip" x4 Puree (IDDSI 4) - spoonful x1 Soft Solid (IDDSI 6) - bite x1 Hard Solid (IDDSI 7) - bite x1 Mixed Consistency - spoonful x3 Comments   Comments / Observations           Dysarthria and right facial asymmetry    Bolus prep + mastication        Anterior munching Anterior munching, extended mastication     Bolus transport + lingual motion  Delayed lingual motion   Delayed lingual motion  Delayed lingual motion Delayed lingual motion Delayed lingual motion     Oral residue   Mild- BOT            Tongue base retraction              Initiation of pharyngeal swallow  Bolus head in pyriforms   Bolus head in pyriforms  Bolus head in valleculae Bolus head in valleculae Bolus head in valleculae     Soft Palate elevation               Anterior hyoid excursion  Reduced   Reduced  Reduced Reduced Reduced     Epiglottic movement              Laryngeal elevation /Laryngeal vestibule closure              Pharyngeal stripping wave  Diminished    Diminished   Diminished  Diminished  Diminished      Pharyngeal contraction              Pharyngoesophageal segment opening + clearance  Partial distension/ duration   Partial distension/ duration  Partial distension/ duration Partial distension/ duration Partial distension/ duration   "   Pharyngeal residue  Mild to moderate - diffuse            Esophageal clearance               PAS   4   2  1 1 1     Strategies attempted                 RATING SCALES:  Penetration Aspiration Scale (PAS) pertinent findngs:   1.        Material does not enter airway  2.        Material enters the airway, remains above the vocal folds,             and is ejected from the airway.  3.        Material enters the airway, remains above the vocal folds,             and is not ejected from the airway.  4         Material enters the airway, contacts the vocal folds,             and is ejected from the airway.  5.        Material enters the airway, contacts the vocal folds,             and is not ejected from the airway.  6.        Material enters the ariway, passes below the vocal folds,             and is ejected into the larynx or out of the airway.  7.        Material enters the airway, passes below the vocal folds,             and is not ejected from the trachea despite effort.  8.        Material enters the airway, passes below the vocal folds,             and no effort is made to eject.  Source:  SEBASTIAN Lowry, JAZMYNE Rush, Renea GOLDBERG, LEATHA Moura, & LEATHA Kirby.  A Penetration-Aspiration Scale.  Dysphagia  11:93-98, 1996.    Education     Education: Results of the study were discussed with the Patient and KAL. Understanding was verbalized.     SLP educated Pt at length on MBSS results and diet recommendations. SLP allowing Pt to view MBSS via video unit within depth explanation of anatomy and function of swallow. SLP providing visual evidence of residue.  All questions and comments addressed and comprehension demonstrated.    Barriers to Learning:     Teaching Method: Verbal, Audio/Visual    Assessment     Wallace Lowry is a 68 y.o. male referred for Modified Barium Swallow Study to assess dysphagia following multiple CVAs. The patient presents with Mild Oropharyngeal Dysphagia as determined by the Dysphagia Outcome and  Severity Scale (JEFF). Level 5: Mild Dysphagia.    Modified Barium Swallow Study (MBSS) revealed oral phase characterized by reduced lingual and labial strength and range of motion for tongue control, bolus preparation and transport. Lip closure was reduced with escape beyond mid chin with thin liquids and cup drinking. Bolus prep and mastication was disorganized with solid pieces of bolus unchewed secondary to poor dentition.  Lingual motion was delayed. There was mild oral residue on BOT with thin liquids which was reduced with a secondary swallow. The swallow was initiated when the head of the bolus entered the pyriform sinuses with liquids and at valleculae with other trials.     Pharyngeal phase characterized by delayed initiation of swallow across consistencies. The soft palate elevated for complete of the velopharyngeal port. During pharyngeal swallow, there was reduced anterior hyoid excursion and pharyngeal stripping wave resulted in complete epiglottic inversion and partial UES opening with mild to moderate diffuse residue with thin liquids requiring a secondary swallow to clear. There was consistent penetration with both thin and thick liquids before the swallow secondary to the pharyngeal swallow delay. No aspiration was visualized during this study.     On esophageal screen, no proximal esophageal obstruction or retrograde noted.       Pt presents with the following Pillars of Pneumonia (Carrington, 2008)   Risk factors Present (yes/no) Comments   Impaired health status yes    Poor oral health  no    High prevalence of dysphagia risk factors no        Dysphagia Outcome and Severity Scale (JEFF): Level 5: Mild Dysphagia  Level 7: Normal in all situations  Level 6: WFL/Mod I  Level 5: Mild Dysphagia  Level 4: Mild-Mod Dysphagia  Level 3: Moderate Dysphagia  Level 2: Moderate-Severe Dysphagia  Level 1: Severe Dysphagia  (See Dysphagia 14:139-145, 1999 for more details on scaling)    Dysphagia outcome and  severity scale  __________________________________________________________________________________________________________________________________________    Full per-oral nutrition (P.O): Normal diet   __________________________________________________________________________________________________________________________________________    Level 7:  Normal in all situations   Normal diet   No strategies or extra time needed     Level 6:  Within functional limits/modified independence  Normal diet, functional swallow   Patient may have mild oral or pharyngeal delay, retention or trace epiglottal undercoating but independently and spontaneously compensates/clears   May need extra time for meal   Have no aspiration or penetration across consistencies     Full P.O: Modified diet and/or independence   _________________________________________________________________________________________________________________________________________    Level 5: Mild dysphagia: Distant supervision, may need one diet consistency restricted   May exhibit one or more of the following   Aspiration of thin liquids only but with strong reflexive cough to clear completely   Airway penetration midway to cords with one or more consistency or to cords with one consistency but clears spontaneously   Retention in pharynx that is cleared spontaneously Mild oral dysphagia with reduced mastication and/or oral retention that is cleared spontaneously     Level 4: Mild-moderate dysphagia: Intermittent supervision/cueing, one or two consistencies restricted   May exhibit one or more of the following  Retention in pharynx cleared with cue   Retention in the oral cavity that is cleared with cue   Aspiration with one consistency, with weak or no reflexive cough   Or airway penetration to the level of the vocal cords with cough with two consistencies   Or airway penetration to the level of the vocal cords without cought with one consistency     Level 3:  Moderate dysphagia: Total assist, supervision, or strategies, two or more diet consistencies restricted   May exhibit one or more of the following   Moderate retention in pharynx, cleared with cue   Moderate retention in oral cavity, cleared with cue   Airway penetration to the level of the vocal cords without cough with two or more consistencies   Or aspiration with two consistencies, with weak or no reflexive cough   Or aspiration with one consistency, no cough and airway penetration to cords with one, no cough     Nonoral nutrition necessary   ____________________________________________________________________________________________________________________________________________    Level 2: Moderately severe dysphagia: Maximum assistance or use of strategies with partial P.O. only (tolerates at least one consistency safely with total use of strategies)   May exhibit one or more of the following   Severe retention in pharynx, unable to clear or needs multiple cues   Severe oral stage bolus loss or retention, unable to clear or needs multiple cues   Aspiration with two or more consistencies, no reflexive cough, weak volitional cough   Or aspiration with one or more consistency, no cough and airway penetration to cords with one or more consistency, no cough     Level 1: Severe dysphagia: NPO: Unable to tolerate any P.O. safely   May exhibit one or more of the following   Severe retention in pharynx, unable to clear   Severe oral stage bolus loss or retention, unable to clear   Silent aspiration with two or more consistencies, nonfunctional volitional cough   Or unable to achieve swallow  _____________________________________________________________________________________________________________________________________________    (JOSE Palmer, 1999) Dysphagia Outcome and Severity Scale       Functional Oral Intake Scale (FOIS) (eBrt et al., 2005)  The FOIS is a clinical assessment measure used the define  the functional oral intake of patients.     FOIS level   Description   1  Nothing by mouth.   2  Tube dependent with minimal attempts of food or liquid.   3  Tube dependent with consistent oral intake of food or liquid.   4  Total oral diet of a single consistency.   5  Total oral diet with multiple consistencies, but required special    preparation or compensation.   6  Total oral diet with multiple consistencies without special preparation    but with specific food limitations.   7  Total oral diet with no restrictions.     Patient's current FOIS score = 6      Recommendations:     Consistency Recommendations:  thin liquids (IDDSI 0) and soft and bite size consistencies (IDDSI 6).   Precautions:supervision recommended, decrease bite/drink size, swallow 2 times, check mouth for residue after meals, and frequent oral care  Risk Management: use good oral hygiene , sit upright for all PO intake, and increase physical mobility as tolerated  Specialist Referrals:   Ancillary Tests:   Therapy: [facility] ST recommended to address dysphagia and dysarthria  Follow-up exam: f/u MBS as needed.       Please contact [facility] at [phone] if there are questions re: the above.    Therapist's Name:   Deanna Melvin M.S., L-SLP, CCC-SLP  Speech-Language Pathologist    Date: 10/21/2024

## 2024-10-29 ENCOUNTER — LAB REQUISITION (OUTPATIENT)
Dept: LAB | Facility: HOSPITAL | Age: 69
End: 2024-10-29
Payer: MEDICARE

## 2024-10-29 DIAGNOSIS — E78.5 HYPERLIPIDEMIA, UNSPECIFIED: ICD-10-CM

## 2024-10-29 DIAGNOSIS — I10 ESSENTIAL (PRIMARY) HYPERTENSION: ICD-10-CM

## 2024-10-29 DIAGNOSIS — I69.351 HEMIPLEGIA AND HEMIPARESIS FOLLOWING CEREBRAL INFARCTION AFFECTING RIGHT DOMINANT SIDE: ICD-10-CM

## 2024-10-29 DIAGNOSIS — J44.9 CHRONIC OBSTRUCTIVE PULMONARY DISEASE, UNSPECIFIED: ICD-10-CM

## 2024-10-29 LAB
ANION GAP SERPL CALC-SCNC: 5 MEQ/L (ref 2–13)
BUN SERPL-MCNC: 16 MG/DL (ref 7–20)
CALCIUM SERPL-MCNC: 9.5 MG/DL (ref 8.4–10.2)
CHLORIDE SERPL-SCNC: 106 MMOL/L (ref 98–110)
CO2 SERPL-SCNC: 28 MMOL/L (ref 21–32)
CREAT SERPL-MCNC: 1.04 MG/DL (ref 0.66–1.25)
CREAT/UREA NIT SERPL: 15 (ref 12–20)
GFR SERPLBLD CREATININE-BSD FMLA CKD-EPI: 79 ML/MIN/1.73/M2
GLUCOSE SERPL-MCNC: 85 MG/DL (ref 70–115)
POTASSIUM SERPL-SCNC: 4.3 MMOL/L (ref 3.5–5.1)
SODIUM SERPL-SCNC: 139 MMOL/L (ref 136–145)

## 2024-10-29 PROCEDURE — 80048 BASIC METABOLIC PNL TOTAL CA: CPT | Performed by: FAMILY MEDICINE

## 2025-02-21 ENCOUNTER — HOSPITAL ENCOUNTER (OUTPATIENT)
Dept: RADIOLOGY | Facility: HOSPITAL | Age: 70
Discharge: HOME | End: 2025-02-21
Attending: FAMILY MEDICINE
Payer: MEDICARE

## 2025-02-21 DIAGNOSIS — J44.9 COPD (CHRONIC OBSTRUCTIVE PULMONARY DISEASE): ICD-10-CM

## 2025-02-21 PROCEDURE — 71046 X-RAY EXAM CHEST 2 VIEWS: CPT | Mod: TC

## 2025-04-15 ENCOUNTER — HOSPITAL ENCOUNTER (OUTPATIENT)
Dept: PREADMISSION TESTING | Facility: HOSPITAL | Age: 70
Discharge: HOME | End: 2025-04-15
Attending: FAMILY MEDICINE
Payer: MEDICARE

## 2025-04-15 VITALS — HEIGHT: 64 IN | WEIGHT: 138.5 LBS | BODY MASS INDEX: 23.64 KG/M2

## 2025-04-15 DIAGNOSIS — Z12.11 COLON CANCER SCREENING: ICD-10-CM

## 2025-04-15 DIAGNOSIS — Z12.11 SCREENING FOR COLON CANCER: Primary | ICD-10-CM

## 2025-04-15 DIAGNOSIS — R53.1 WEAKNESS: ICD-10-CM

## 2025-04-15 DIAGNOSIS — E44.0 MODERATE MALNUTRITION: ICD-10-CM

## 2025-04-15 RX ORDER — ALBUTEROL SULFATE 5 MG/ML
2.5 SOLUTION RESPIRATORY (INHALATION) EVERY 6 HOURS PRN
COMMUNITY

## 2025-04-15 RX ORDER — SODIUM CHLORIDE, SODIUM LACTATE, POTASSIUM CHLORIDE, CALCIUM CHLORIDE 600; 310; 30; 20 MG/100ML; MG/100ML; MG/100ML; MG/100ML
INJECTION, SOLUTION INTRAVENOUS CONTINUOUS
OUTPATIENT
Start: 2025-04-25

## 2025-04-15 RX ORDER — TORSEMIDE 20 MG/1
1 TABLET ORAL DAILY
COMMUNITY
Start: 2024-11-06

## 2025-04-15 RX ORDER — CYCLOBENZAPRINE HCL 10 MG
10 TABLET ORAL NIGHTLY
COMMUNITY
Start: 2024-09-23

## 2025-04-15 RX ORDER — CLOPIDOGREL BISULFATE 75 MG/1
75 TABLET ORAL DAILY
COMMUNITY
Start: 2025-03-26

## 2025-04-15 RX ORDER — GABAPENTIN 300 MG/1
300 CAPSULE ORAL NIGHTLY
COMMUNITY
Start: 2025-04-09

## 2025-04-15 RX ORDER — VALSARTAN 160 MG/1
1 TABLET ORAL DAILY
COMMUNITY
Start: 2024-10-09

## 2025-04-15 NOTE — DISCHARGE INSTRUCTIONS

## 2025-04-23 ENCOUNTER — ANESTHESIA EVENT (OUTPATIENT)
Dept: GASTROENTEROLOGY | Facility: HOSPITAL | Age: 70
End: 2025-04-23
Payer: MEDICARE

## 2025-04-24 NOTE — ANESTHESIA PREPROCEDURE EVALUATION
04/23/2025  Wallace Lowry is a 69 y.o., male presents for colonoscopy        Prescription Medications  Within last 14 days from 04/23/25   Last Taken Last Updated   albuterol (PROVENTIL) 5 mg/mL nebulizer solution        amLODIPine (NORVASC) 10 MG tablet    More than a month 06/15/23 1754   aspirin 81 MG Chew        atorvastatin (LIPITOR) 80 MG tablet    More than a month 06/15/23 1754   clopidogreL (PLAVIX) 75 mg tablet        cyclobenzaprine (FLEXERIL) 10 MG tablet        folic acid (FOLVITE) 1 MG tablet        gabapentin (NEURONTIN) 300 MG capsule        labetaloL (NORMODYNE) 200 MG tablet        multivitamin Tab        torsemide (DEMADEX) 20 MG Tab        valsartan (DIOVAN) 160 MG tablet              Pre-op Assessment    I have reviewed the Patient Summary Reports.     I have reviewed the Nursing Notes. I have reviewed the NPO Status.   I have reviewed the Medications.     Review of Systems  Anesthesia Hx:  No problems with previous Anesthesia             Denies Family Hx of Anesthesia complications.    Denies Personal Hx of Anesthesia complications.                    Social:  Former Smoker       Hematology/Oncology:  Hematology Normal   Oncology Normal                                   EENT/Dental:  EENT/Dental Normal           Cardiovascular:     Hypertension, well controlled   CABG/stent        hyperlipidemia ZAPATA                        Hypertension         Pulmonary:   COPD, moderate Asthma                    Renal/:  Renal/ Normal                 Hepatic/GI:  Hepatic/GI Normal                    Musculoskeletal:  Musculoskeletal Normal                Neurological:   CVA, residual symptoms                       CVA - Cerebrovasular Accident                 Endocrine:  Endocrine Normal            Dermatological:  Skin Normal    Psych:  Psychiatric Normal                  Physical Exam  General:  Well nourished, Cooperative, Alert and Oriented    Airway:  Mouth Opening: Normal  TM Distance: Normal  Tongue: Normal  Neck ROM: Normal ROM    Dental:        Anesthesia Plan  Type of Anesthesia, risks & benefits discussed:    Anesthesia Type: MAC  Intra-op Monitoring Plan: Standard ASA Monitors  Post Op Pain Control Plan:   (medical reason for not using multimodal pain management)  Induction:  IV  Informed Consent: Informed consent signed with the Patient and all parties understand the risks and agree with anesthesia plan.  All questions answered. Patient consented to blood products? Yes  ASA Score: 3  Day of Surgery Review of History & Physical: H&P Update referred to the surgeon/provider.I have interviewed and examined the patient. I have reviewed the patient's H&P dated: There are no significant changes.     Ready For Surgery From Anesthesia Perspective.     .

## 2025-04-25 ENCOUNTER — HOSPITAL ENCOUNTER (OUTPATIENT)
Dept: GASTROENTEROLOGY | Facility: HOSPITAL | Age: 70
Discharge: HOME | End: 2025-04-25
Attending: FAMILY MEDICINE
Payer: MEDICARE

## 2025-04-25 ENCOUNTER — ANESTHESIA (OUTPATIENT)
Dept: GASTROENTEROLOGY | Facility: HOSPITAL | Age: 70
End: 2025-04-25
Payer: MEDICARE

## 2025-04-25 VITALS
HEART RATE: 98 BPM | BODY MASS INDEX: 23.64 KG/M2 | TEMPERATURE: 98 F | RESPIRATION RATE: 18 BRPM | HEIGHT: 64 IN | SYSTOLIC BLOOD PRESSURE: 149 MMHG | WEIGHT: 138.44 LBS | DIASTOLIC BLOOD PRESSURE: 88 MMHG | OXYGEN SATURATION: 96 %

## 2025-04-25 DIAGNOSIS — Z12.11 SCREENING FOR COLON CANCER: ICD-10-CM

## 2025-04-25 DIAGNOSIS — Z12.11 COLON CANCER SCREENING: ICD-10-CM

## 2025-04-25 DIAGNOSIS — Z12.11 SCREEN FOR COLON CANCER: ICD-10-CM

## 2025-04-25 PROCEDURE — 63600175 PHARM REV CODE 636 W HCPCS: Performed by: FAMILY MEDICINE

## 2025-04-25 PROCEDURE — 63600175 PHARM REV CODE 636 W HCPCS: Performed by: NURSE ANESTHETIST, CERTIFIED REGISTERED

## 2025-04-25 PROCEDURE — 37000009 HC ANESTHESIA EA ADD 15 MINS

## 2025-04-25 PROCEDURE — 27201423 OPTIME MED/SURG SUP & DEVICES STERILE SUPPLY

## 2025-04-25 PROCEDURE — 45385 COLONOSCOPY W/LESION REMOVAL: CPT | Mod: PT | Performed by: FAMILY MEDICINE

## 2025-04-25 PROCEDURE — 37000008 HC ANESTHESIA 1ST 15 MINUTES

## 2025-04-25 RX ORDER — PROPOFOL 10 MG/ML
VIAL (ML) INTRAVENOUS
Status: DISCONTINUED | OUTPATIENT
Start: 2025-04-25 | End: 2025-04-25

## 2025-04-25 RX ORDER — LIDOCAINE HYDROCHLORIDE 20 MG/ML
INJECTION INTRAVENOUS
Status: DISCONTINUED | OUTPATIENT
Start: 2025-04-25 | End: 2025-04-25

## 2025-04-25 RX ORDER — SODIUM CHLORIDE, SODIUM LACTATE, POTASSIUM CHLORIDE, CALCIUM CHLORIDE 600; 310; 30; 20 MG/100ML; MG/100ML; MG/100ML; MG/100ML
INJECTION, SOLUTION INTRAVENOUS CONTINUOUS
Status: DISCONTINUED | OUTPATIENT
Start: 2025-04-25 | End: 2025-04-26 | Stop reason: HOSPADM

## 2025-04-25 RX ADMIN — PROPOFOL 30 MG: 10 INJECTION, EMULSION INTRAVENOUS at 08:04

## 2025-04-25 RX ADMIN — PROPOFOL 50 MG: 10 INJECTION, EMULSION INTRAVENOUS at 08:04

## 2025-04-25 RX ADMIN — LIDOCAINE HYDROCHLORIDE 50 MG: 20 INJECTION, SOLUTION INTRAVENOUS at 08:04

## 2025-04-25 RX ADMIN — SODIUM CHLORIDE, POTASSIUM CHLORIDE, SODIUM LACTATE AND CALCIUM CHLORIDE: 600; 310; 30; 20 INJECTION, SOLUTION INTRAVENOUS at 07:04

## 2025-04-25 NOTE — ANESTHESIA POSTPROCEDURE EVALUATION
Anesthesia Post Evaluation    Patient: Wallace Lowry    Procedure(s) Performed: * No procedures listed *    Final Anesthesia Type: MAC      Patient location during evaluation: OPS  Patient participation: Yes- Able to Participate  Level of consciousness: awake and alert, awake and oriented  Post-procedure vital signs: reviewed and stable  Pain management: adequate  Airway patency: patent    PONV status at discharge: No PONV  Anesthetic complications: no      Cardiovascular status: blood pressure returned to baseline  Respiratory status: unassisted, room air and spontaneous ventilation  Hydration status: euvolemic  Follow-up not needed.              Vitals Value Taken Time   /101 04/25/25 07:21   Temp 36.6 °C (97.8 °F) 04/25/25 07:21   Pulse 96 04/25/25 07:21   Resp 18 04/25/25 07:21   SpO2 98 % 04/25/25 07:21         No case tracking events are documented in the log.      Pain/Luis Score: No data recorded

## 2025-04-25 NOTE — OR NURSING
Patient is eating jello and tolerating it well, No s/s of distress noted, SR up x 2 with call bell in reach, Family member at bedside, Stable condition

## 2025-04-25 NOTE — OR NURSING
Patient is back to his room in stable condition, No difficulty breathing or swallowing, No s/s of distress noted, SR up x 2 with call bell in reach, Family member in room with patient, Denies pain, Positive flatus

## 2025-04-25 NOTE — OP NOTE
Procedure-colonoscopy    Indication-screening    Anesthesia-MAC    Rectal exam-WNL      Patient brought to the procedure room and placed on left side. Scope inserted and advanced to the cecum confirmed by the appendiceal orifice. Scope then withdrawn and mucosa carefully examined. Pt. Tolerated procedure well.    Findings-  Fairly extensive diverticulosis of the left colon  A large, >2 cm pedunculated polyp of the descending colon. Removed with snare cautery polypectomy. Hemostasis clip placed at site of defect. Collected for pathology.       Recommendations:   1. F/u path report  2. Likely repeat screen in 3 years or less depending on pathology results.

## 2025-04-25 NOTE — DISCHARGE SUMMARY
[facility]  [facility]  Short Stay    Colonoscopy      OUTCOME: Patient tolerated treatment/procedure well without complication and is now ready for discharge.    DISPOSITION: Home or Self Care    FINAL DIAGNOSIS:  <principal problem not specified>    FOLLOWUP: In clinic    DISCHARGE INSTRUCTIONS:  No discharge procedures on file.     TIME SPENT ON DISCHARGE:  minutes

## 2025-04-25 NOTE — OR NURSING
Dr. Augustin at bedside, Discussed procedure and findings with patient and family member, Allowed time for questions, Verbalized understanding

## 2025-05-01 ENCOUNTER — DOCUMENT SCAN (OUTPATIENT)
Dept: HOME HEALTH SERVICES | Facility: HOSPITAL | Age: 70
End: 2025-05-01
Payer: MEDICARE